# Patient Record
Sex: FEMALE | Race: WHITE | NOT HISPANIC OR LATINO | URBAN - METROPOLITAN AREA
[De-identification: names, ages, dates, MRNs, and addresses within clinical notes are randomized per-mention and may not be internally consistent; named-entity substitution may affect disease eponyms.]

---

## 2020-09-23 ENCOUNTER — EMERGENCY (EMERGENCY)
Facility: HOSPITAL | Age: 60
LOS: 0 days | Discharge: HOME | End: 2020-09-23
Attending: EMERGENCY MEDICINE | Admitting: EMERGENCY MEDICINE
Payer: COMMERCIAL

## 2020-09-23 VITALS
DIASTOLIC BLOOD PRESSURE: 71 MMHG | RESPIRATION RATE: 19 BRPM | TEMPERATURE: 99 F | WEIGHT: 179.9 LBS | HEART RATE: 94 BPM | SYSTOLIC BLOOD PRESSURE: 130 MMHG | OXYGEN SATURATION: 98 %

## 2020-09-23 DIAGNOSIS — M25.519 PAIN IN UNSPECIFIED SHOULDER: ICD-10-CM
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
## 2020-09-23 DIAGNOSIS — Z88.0 ALLERGY STATUS TO PENICILLIN: ICD-10-CM
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
## 2020-09-23 DIAGNOSIS — M25.511 PAIN IN RIGHT SHOULDER: ICD-10-CM

## 2020-09-23 PROCEDURE — 99283 EMERGENCY DEPT VISIT LOW MDM: CPT

## 2020-09-23 NOTE — ED ADULT NURSE NOTE - NSIMPLEMENTINTERV_GEN_ALL_ED
Implemented All Universal Safety Interventions:  [place] to call system. Call bell, personal items and telephone within reach. Instruct patient to call for assistance. Room bathroom lighting operational. Non-slip footwear when patient is off stretcher. Physically safe environment: no spills, clutter or unnecessary equipment. Stretcher in lowest position, wheels locked, appropriate side rails in place.
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
## 2020-09-23 NOTE — ED PROVIDER NOTE - PHYSICAL EXAMINATION
Gen: Alert, NAD, well appearing  Head: NC, AT, PERRL, EOMI, normal lids/conjunctiva  ENT: normal hearing  Neck: +supple, no tenderness/meningismus,  Pulm: Bilateral BS, normal resp effort, no wheeze/stridor/retractions  CV: RRR, no murmer  Abd: soft, NT/ND  Mskel: +tender to palpation right shoulder. No swelling or ecchymosis. FROM x 4. No edema/erythema/cyanosis. No back tenderness  Skin: no rash, warm/dry  Neuro: AAOx3, no sensory/motor deficits

## 2020-09-23 NOTE — ED PROVIDER NOTE - PATIENT PORTAL LINK FT
You can access the FollowMyHealth Patient Portal offered by [facility] by registering at the following website: [email or web address]. By joining [facility]’s FollowMyHealth portal, you will also be able to view your health information using other applications (apps) compatible with our system.

## 2020-09-23 NOTE — ED PROVIDER NOTE - NS ED ROS FT
Review of Systems    Constitutional: (-) fever, (-) chills  Eyes/ENT: (-) blurry vision, (-) epistaxis, (-) sore throat  Cardiovascular: (-) chest pain, (-) syncope  Respiratory: (-) cough, (-) shortness of breath  Gastrointestinal: (-) pain, (-) nausea, (-) vomiting, (-) diarrhea  Musculoskeletal: (-) neck pain, (-) back pain, (+) right shoulder pain  Integumentary: (-) rash, (-) edema  Neurological: (-) headache, (-) altered mental status  Psychiatric: (-) hallucinations  Allergic/Immunologic: (-) pruritus

## 2020-09-23 NOTE — ED ADULT NURSE NOTE - CAS DISCH TRANSFER METHOD
Date of Service: 07/23/2019    CHIEF COMPLAINT:  The patient is a 67-year-old.  She is seen today in allergy followup.    HISTORY OF PRESENT ILLNESS:  The patient has chronic allergic rhinitis and chronic allergic conjunctivitis.  Her skin tests have been positive for oak and birch tree pollens, as well as cat and dog.  She has 2 cats and 2 dogs in the house.  She does not feel that the Allegra is doing much.  She has previously taken Claritin and Zyrtec as well, and she feels that they did not help either.  She has taken Singulair as well and she did not feel this provided significant benefit.  She is having benefit with Flonase.  She is taking Flonase 2 sprays per nostril daily, but she is still symptomatic.  She complains of nasal congestion and clear rhinorrhea, sneezing, postnasal drip and nasal itching.  Because of her allergic rhinitis and because of her allergic conjunctivitis, she has intermittent ocular pruritus and watery eyes.  She is not having any facial pain, sinus headache, purulent rhinorrhea, or fevers or chills.  She is not having any urticaria, angioedema, or eczema.  She continues to have difficulties with hyposmia.  She has still not made an appointment to see ENT, and I again emphasized today that she sees ENT regarding the hyposmia.  She has chronic cough.  She has this in part from postnasal drainage and also she has reflux.  She has been seen by GI since she saw me.  She is taking Omeprazole and Zantac, she reports.  She is following up with GI.  Currently, she is not having any heartburn, she reports.  She also has shortness of breath with exertion and she has been seen by Dr. Wetzel, but she has not followed up and Dr. Wetzel did want to do cardiopulmonary exercise testing.  I emphasized that she follow up with Dr. Wetzel as well.  She has a contact dermatitis with latex and also with metals, and she has been doing a great job with avoidance regarding this and no eczema since we last  saw her.  No urticaria or angioedema either.    DRUG ALLERGIES:  Reviewed, recorded in Epic.    CURRENT MEDICATIONS:  Reviewed, recorded in [facility].    SOCIAL HISTORY:  Former smoker but currently does not smoke and has no passive smoke exposure.  Has 2 cats, 2 dogs, and 2 horses.    REVIEW OF SYSTEMS:  As stated in HPI.    PHYSICAL EXAMINATION:  GENERAL:  No acute distress, well developed and well nourished.  VITALS:  Stable, are reviewed, are recorded in Epic.  HEENT:  Conjunctivae are not injected.  No ocular discharge or swelling.  She has some nasal mucosal edema, some clear rhinorrhea but no sinus tenderness, no purulent secretions.  Oropharynx is moist and clear.  There is no thrush.  There is no angioedema including no lip, tongue, throat, face or eyelid swelling.  LUNGS:  Respiratory effort is normal.  Lungs are clear to auscultation.  There is no wheezing.  HEART:  Regular rate and rhythm.  SKIN:  No urticaria, angioedema, or eczema.    IMPRESSION AND RECOMMENDATIONS:  The patient has chronic allergic rhinitis and chronic allergic conjunctivitis.  Her skin tests have been positive for cat and dog as well as oak and birch tree pollens.  I emphasized diligent allergen avoidance and environmental control.  She is not going to be able to find new homes for her pets, but she will do her best in terms of allergen avoidance and environmental control.  We discussed allergic rhinitis, allergic conjunctivitis and we discussed treatment options.  She is having partial relief with Flonase and she will continue Flonase 2 sprays per nostril daily.  Antihistamines such as Allegra, Claritin and Zyrtec have not significantly helped her.  Also, Singulair has not significantly helped her.  We will do a trial of Astelin 2 sprays per nostril 2 times daily.  We will see how she does with a combination of Flonase 2 sprays per nostril daily and Astelin 2 sprays per nostril 2 times daily.  She can also do NasoGel or Ayr Gel for nasal  dryness.  For the allergic conjunctivitis, I prescribed Optivar eyedrops, place 1 drop into both eyes 2 times daily as needed for eye allergy symptoms.  I emphasized that she get regular comprehensive eye exams through the eye doctor as well.  I also discussed with her in detail, indications, objectives, risks, benefits, alternatives, schedule and duration of allergen specific immunotherapy injections.  She reports that she is going to think about this, but right now she does not want to start immunotherapy.  She has a chronic cough, and I believe this is multifactorial including from allergic rhinitis and postnasal drainage and also from her reflux.  Emphasized diligent reflux precautions and to take Omeprazole and Zantac as instructed by GI and follow up with GI regarding reflux as well.  Regarding her shortness of breath with exertion, I emphasized to follow up with Dr. Wetzel.  She was seen by Dr. Wetzel and he wanted to do cardiopulmonary exercise testing, but the patient has still not done this.  I emphasized that she follow up with Dr. Wetzel and pursue diagnostic testing as instructed by him.  She understands this.      Regarding her hyposmia, I have already referred her to our ENT Department here with Dr. Obrien at the medical office building.      Regarding the contact dermatitis with latex and also with metals, she will continue avoidance measures, and she will use products free of allergens and irritants such as Vanicream.  She does not need topical steroids at this time but more difficulties with eczema to let me know.      Any questions or concerns from the allergy standpoint, I can be notified.  Side effects of medications and proper way of using them emphasized.  Above was discussed in detail and all questions were answered.    Total time was 40 minutes, more than half of it was spent in discussion regarding the above-mentioned issues.      Dictated By: Wally Swift MD  Signing  Provider: MD CED Caraballo/arthur ([id number])  DD: 07/23/2019 12:43:15 TD: 07/24/2019 10:14:10    Copy Sent To:     cc: Fady Patino MD     Private car

## 2024-03-15 ENCOUNTER — OFFICE VISIT (OUTPATIENT)
Age: 64
End: 2024-03-15

## 2024-03-15 VITALS
BODY MASS INDEX: 27.38 KG/M2 | SYSTOLIC BLOOD PRESSURE: 110 MMHG | DIASTOLIC BLOOD PRESSURE: 78 MMHG | WEIGHT: 148.8 LBS | OXYGEN SATURATION: 95 % | HEIGHT: 62 IN | HEART RATE: 77 BPM

## 2024-03-15 DIAGNOSIS — Z98.84 HISTORY OF GASTRIC BYPASS: ICD-10-CM

## 2024-03-15 DIAGNOSIS — R10.32 LEFT LOWER QUADRANT PAIN: ICD-10-CM

## 2024-03-15 DIAGNOSIS — Z80.0 FAMILY HISTORY OF COLON CANCER: ICD-10-CM

## 2024-03-15 DIAGNOSIS — D50.8 OTHER IRON DEFICIENCY ANEMIA: ICD-10-CM

## 2024-03-15 DIAGNOSIS — R10.31 RIGHT LOWER QUADRANT PAIN: ICD-10-CM

## 2024-03-15 DIAGNOSIS — R63.0 DECREASED APPETITE: ICD-10-CM

## 2024-03-15 DIAGNOSIS — K21.9 GASTROESOPHAGEAL REFLUX DISEASE, UNSPECIFIED WHETHER ESOPHAGITIS PRESENT: Primary | ICD-10-CM

## 2024-03-15 PROCEDURE — 99204 OFFICE O/P NEW MOD 45 MIN: CPT | Performed by: INTERNAL MEDICINE

## 2024-03-15 RX ORDER — ELECTROLYTES/DEXTROSE
1 SOLUTION, ORAL ORAL DAILY
COMMUNITY

## 2024-03-15 RX ORDER — LAMOTRIGINE 200 MG/1
TABLET ORAL
COMMUNITY
Start: 2010-01-14

## 2024-03-15 RX ORDER — LAMOTRIGINE 100 MG/1
100 TABLET ORAL 2 TIMES DAILY
COMMUNITY
Start: 2024-02-07

## 2024-03-15 RX ORDER — LORAZEPAM 0.5 MG/1
0.5 TABLET ORAL 2 TIMES DAILY PRN
COMMUNITY
Start: 2024-01-24

## 2024-03-15 RX ORDER — CITALOPRAM 40 MG/1
40 TABLET ORAL DAILY
COMMUNITY

## 2024-03-15 RX ORDER — FERROUS SULFATE 15 MG/ML
DROPS ORAL
COMMUNITY
Start: 2023-11-01

## 2024-03-15 NOTE — PROGRESS NOTES
[facility]      Chief Complaint: Bile reflux history of gastric bypass 20 years ago.  Patient has been coughing bile    HPI:  Perri Cortes is a 64 y.o.  female who presents with in the evening.  She does not have actual heartburn but does occasionally get a sensation of fullness.  Her appetite is decreased.  She has not had any weight loss.  She had gastric bypass surgery done about 20 years ago.  Patient has postprandial right upper quadrant and right lower quadrant discomfort and recently has developed left lower quadrant discomfort as well.  These are intermittent and with no definitive exacerbating or remitting factor.  She has occasional rare dysphagia for solids.  She has no melena hematochezia or rectal bleeding.  She has not had an EGD since prior to her bypass surgery.  Last colonoscopy was 4 years ago for family history of colon cancer.  No change in bowel habits or stool caliber.  No other issues..      Review of Systems:   Constitutional: No fever or chills, feels well, no tiredness, no recent weight gain or weight loss.   HENT: No complaints of earache, no hearing loss, no nosebleeds, no nasal discharge, no sore throat, no hoarseness.    Eyes: No complaints of eye pain, no red eyes, no discharge from eyes, no itchy eyes.  Cardiovascular: No complaints of slow heart rate, no fast heart rate, no chest pain, no palpitations, no leg claudication, no lower extremity edema.   Respiratory: No complaints of shortness of breath, no wheezing, no cough, no SOB on exertion, no orthopnea.   Gastrointestinal: As noted in HPI  Genitourinary: No complaints of dysuria, no incontinence, no hesitancy, no nocturia.   Musculoskeletal: Positive arthralgia, no myalgias, no joint swelling or stiffness, no limb pain or swelling.   Neurological: No complaints of headache, no confusion, no convulsions, no numbness or tingling, no dizziness or fainting, no limb weakness, no difficulty walking.   "  Skin: No complaints of skin rash or skin lesions, no itching, no skin wound, no dry skin.    Hematological/Lymphatic: No complaints of swollen glands, does not bleed easy.   Allergic/Immunologic: No immunocompromised state.  Endocrine:  No complaints of polyuria, no polydipsia.   Psychiatric/Behavioral: Anxiety and depression, PTSD      Historical Information   Past Medical History:   Diagnosis Date    ADHD     Anemia     Anxiety and depression     Iron malabsorption      Past Surgical History:   Procedure Laterality Date    APPENDECTOMY      BARIATRIC SURGERY      ENDOMETRIAL ABLATION      HERNIA REPAIR       Social History   Social History     Substance and Sexual Activity   Alcohol Use Not Currently     Social History     Substance and Sexual Activity   Drug Use Not on file     Social History     Tobacco Use   Smoking Status Former    Types: Cigarettes   Smokeless Tobacco Never     Family History   Problem Relation Age of Onset    Diabetes Mother     Colon cancer Father     Colon cancer Maternal Grandmother          Current Medications: has a current medication list which includes the following prescription(s): citalopram, iron (ferrous sulfate), lamotrigine, lamotrigine, lorazepam, mefloquine hcl, and multivitamin adult.        Vital Signs: /78   Pulse 77   Ht 5' 2\" (1.575 m)   Wt 67.5 kg (148 lb 12.8 oz)   SpO2 95%   BMI 27.22 kg/m²       Physical Exam:   Constitutional  General Appearance: No acute distress, well appearing and well nourished  Head  Normocephalic  Eyes  Conjunctivae and lids: No swelling, erythema, or discharge.    Pupils and irises: Equal, round and reactive to light.   Ears, Nose, Mouth, and Throat  External inspection of ears and nose: Normal  Nasal mucosa, septum and turbinates: Normal without edema or erythema/   Oropharynx: Normal with no erythema, edema, exudate or lesions.   Neck  Normal range of motion. Neck supple.   Cardiovascular  Auscultation of the heart: Normal rate " and rhythm, normal S1 and S2 without murmurs.  Examination of the extremities for edema and/or varicosities: Normal  Pulmonary/Chest  Respiratory effort: No increased work of breathing or signs of respiratory distress.   Auscultation of lungs: Clear to auscultation, equal breath sounds bilaterally, no wheezes, rales, no rhonchi.   Abdomen  Abdomen: Non-tender, no masses.   Liver and spleen: No hepatomegaly or splenomegaly.   Musculoskeletal  Gait and station: normal.  Digits and Nails: normal without clubbing or cyanosis.  Inspection/palpation of joints, bones, and muscles: Normal  Neurological  No nystagmus or asterixis.   Skin  Skin and subcutaneous tissue: Normal without rashes or lesions.   Lymphatic  Palpation of the lymph nodes in neck: No lymphadenopathy.   Psychiatric  Orientation to person, place and time: Normal.  Mood and affect: Normal.         Labs:  Lab Results   Component Value Date    ALT 13 12/27/2023    AST 16 12/27/2023    BUN 14 12/27/2023    CALCIUM 9.2 12/27/2023     12/27/2023    CO2 29 12/27/2023    CREATININE 0.68 12/27/2023    K 4.6 12/27/2023         X-Rays & Procedures:   No orders to display           ______________________________________________________________________      Assessment & Plan:     Diagnoses and all orders for this visit:    Gastroesophageal reflux disease, unspecified whether esophagitis present  -     EGD; Future    Other iron deficiency anemia  -     Colonoscopy; Future  -     EGD; Future    Left lower quadrant pain  -     Colonoscopy; Future    Right lower quadrant pain  -     Colonoscopy; Future    Family history of colon cancer  -     Colonoscopy; Future    Decreased appetite  -     EGD; Future    History of gastric bypass  -     EGD; Future      Patient will undergo EGD and colonoscopy.  Further recommendations will depend on study results  Answers submitted by the patient for this visit:  Abdominal Pain Questionnaire (Submitted on 3/14/2024)  Chief  Complaint: Abdominal pain  Chronicity: chronic  Onset: more than 1 year ago  Onset quality: sudden  Frequency: every several days  Episode duration: 2 Hours  Progression since onset: unchanged  Pain location: RLQ  Pain - numeric: 4/10  Pain quality: cramping  Radiates to: does not radiate  anorexia: Yes  arthralgias: Yes  belching: No  constipation: No  diarrhea: Yes  dysuria: No  fever: No  flatus: Yes  frequency: No  headaches: Yes  hematochezia: No  hematuria: No  melena: No  myalgias: Yes  nausea: Yes  weight loss: Yes  vomiting: No  Aggravated by: certain positions, coughing, eating  Relieved by: being still, passing flatus, recumbency

## 2024-04-11 ENCOUNTER — PREP FOR PROCEDURE (OUTPATIENT)
Age: 64
End: 2024-04-11

## 2024-04-15 ENCOUNTER — ANESTHESIA (OUTPATIENT)
Dept: GASTROENTEROLOGY | Facility: HOSPITAL | Age: 64
End: 2024-04-15

## 2024-04-15 ENCOUNTER — HOSPITAL ENCOUNTER (OUTPATIENT)
Dept: GASTROENTEROLOGY | Facility: HOSPITAL | Age: 64
Setting detail: OUTPATIENT SURGERY
Discharge: HOME | End: 2024-04-15
Attending: INTERNAL MEDICINE | Admitting: INTERNAL MEDICINE
Payer: COMMERCIAL

## 2024-04-15 ENCOUNTER — ANESTHESIA EVENT (OUTPATIENT)
Dept: GASTROENTEROLOGY | Facility: HOSPITAL | Age: 64
End: 2024-04-15

## 2024-04-15 VITALS
WEIGHT: 143.52 LBS | RESPIRATION RATE: 16 BRPM | HEART RATE: 65 BPM | SYSTOLIC BLOOD PRESSURE: 109 MMHG | OXYGEN SATURATION: 97 % | DIASTOLIC BLOOD PRESSURE: 59 MMHG | TEMPERATURE: 97.5 F | HEIGHT: 62 IN | BODY MASS INDEX: 26.41 KG/M2

## 2024-04-15 DIAGNOSIS — R10.32 LEFT LOWER QUADRANT PAIN: ICD-10-CM

## 2024-04-15 DIAGNOSIS — Z98.84 HISTORY OF GASTRIC BYPASS: ICD-10-CM

## 2024-04-15 DIAGNOSIS — D50.8 OTHER IRON DEFICIENCY ANEMIA: ICD-10-CM

## 2024-04-15 DIAGNOSIS — R63.0 DECREASED APPETITE: ICD-10-CM

## 2024-04-15 DIAGNOSIS — K22.10 EROSIVE ESOPHAGITIS: Primary | ICD-10-CM

## 2024-04-15 DIAGNOSIS — K21.9 GASTROESOPHAGEAL REFLUX DISEASE, UNSPECIFIED WHETHER ESOPHAGITIS PRESENT: ICD-10-CM

## 2024-04-15 DIAGNOSIS — R10.31 RIGHT LOWER QUADRANT PAIN: ICD-10-CM

## 2024-04-15 DIAGNOSIS — Z80.0 FAMILY HISTORY OF COLON CANCER: ICD-10-CM

## 2024-04-15 PROBLEM — F31.9 BIPOLAR DISORDER (HCC): Status: ACTIVE | Noted: 2021-04-12

## 2024-04-15 PROBLEM — F90.9 ADHD (ATTENTION DEFICIT HYPERACTIVITY DISORDER): Status: ACTIVE | Noted: 2020-01-01

## 2024-04-15 PROBLEM — F41.9 ANXIETY: Status: ACTIVE | Noted: 2022-03-07

## 2024-04-15 PROBLEM — D50.9 IRON DEFICIENCY ANEMIA: Status: ACTIVE | Noted: 2021-04-12

## 2024-04-15 PROCEDURE — 88305 TISSUE EXAM BY PATHOLOGIST: CPT | Performed by: PATHOLOGY

## 2024-04-15 RX ORDER — PANTOPRAZOLE SODIUM 40 MG/1
40 TABLET, DELAYED RELEASE ORAL
Qty: 60 TABLET | Refills: 2 | Status: SHIPPED | OUTPATIENT
Start: 2024-04-15

## 2024-04-15 RX ORDER — LIDOCAINE HYDROCHLORIDE 20 MG/ML
INJECTION, SOLUTION EPIDURAL; INFILTRATION; INTRACAUDAL; PERINEURAL AS NEEDED
Status: DISCONTINUED | OUTPATIENT
Start: 2024-04-15 | End: 2024-04-15

## 2024-04-15 RX ORDER — SODIUM CHLORIDE, SODIUM LACTATE, POTASSIUM CHLORIDE, CALCIUM CHLORIDE 600; 310; 30; 20 MG/100ML; MG/100ML; MG/100ML; MG/100ML
INJECTION, SOLUTION INTRAVENOUS CONTINUOUS PRN
Status: DISCONTINUED | OUTPATIENT
Start: 2024-04-15 | End: 2024-04-15

## 2024-04-15 RX ORDER — PROPOFOL 10 MG/ML
INJECTION, EMULSION INTRAVENOUS AS NEEDED
Status: DISCONTINUED | OUTPATIENT
Start: 2024-04-15 | End: 2024-04-15

## 2024-04-15 RX ADMIN — PROPOFOL 50 MG: 10 INJECTION, EMULSION INTRAVENOUS at 10:44

## 2024-04-15 RX ADMIN — LIDOCAINE HYDROCHLORIDE 80 MG: 20 INJECTION, SOLUTION EPIDURAL; INFILTRATION; INTRACAUDAL at 10:41

## 2024-04-15 RX ADMIN — PROPOFOL 50 MG: 10 INJECTION, EMULSION INTRAVENOUS at 10:53

## 2024-04-15 RX ADMIN — PROPOFOL 50 MG: 10 INJECTION, EMULSION INTRAVENOUS at 10:47

## 2024-04-15 RX ADMIN — PROPOFOL 150 MG: 10 INJECTION, EMULSION INTRAVENOUS at 10:41

## 2024-04-15 RX ADMIN — PROPOFOL 50 MG: 10 INJECTION, EMULSION INTRAVENOUS at 10:50

## 2024-04-15 RX ADMIN — SODIUM CHLORIDE, SODIUM LACTATE, POTASSIUM CHLORIDE, AND CALCIUM CHLORIDE: .6; .31; .03; .02 INJECTION, SOLUTION INTRAVENOUS at 10:09

## 2024-04-15 NOTE — ANESTHESIA POSTPROCEDURE EVALUATION
Post-Op Assessment Note    CV Status:  Stable    Pain management: adequate       Mental Status:  Arousable and sleepy   Hydration Status:  Euvolemic   PONV Controlled:  Controlled   Airway Patency:  Patent     Post Op Vitals Reviewed: Yes    No anethesia notable event occurred.    Staff: CRNA               /56 (04/15/24 1105)    Temp 97.5 °F (36.4 °C) (04/15/24 1105)    Pulse 66 (04/15/24 1105)   Resp 16 (04/15/24 1105)    SpO2 97 % (04/15/24 1105)

## 2024-04-15 NOTE — H&P
"History and Physical -  [facility]  Perri Cortes 64 y.o. female MRN: [id number]                  HPI: Perri Cortes is a 64 y.o. year old female who presents for EGD and colonoscopy for GERD, iron deficiency anemia, decreased appetite, history of gastric bypass, left lower quadrant pain, family history of colon cancer.  Last colonoscopy 4 years ago      REVIEW OF SYSTEMS: Per the HPI, and otherwise unremarkable.    Historical Information   Past Medical History:   Diagnosis Date    ADHD     Anemia     Anxiety and depression     Iron malabsorption      Past Surgical History:   Procedure Laterality Date    APPENDECTOMY      BARIATRIC SURGERY      COLONOSCOPY      ENDOMETRIAL ABLATION      HERNIA REPAIR       Social History   Social History     Substance and Sexual Activity   Alcohol Use Not Currently     Social History     Substance and Sexual Activity   Drug Use Not Currently     Social History     Tobacco Use   Smoking Status Former    Types: Cigarettes   Smokeless Tobacco Never     Family History   Problem Relation Age of Onset    Diabetes Mother     Colon cancer Father     Colon cancer Maternal Grandmother        Meds/Allergies     (Not in a hospital admission)      Allergies   Allergen Reactions    Penicillins Abdominal Pain, Anxiety, Dizziness, Hallucinations, Headache, Hives, Itching, Rash and Swelling       Objective     Blood pressure 123/69, pulse 70, temperature 97.6 °F (36.4 °C), temperature source Temporal, resp. rate 16, height 5' 2\" (1.575 m), weight 65.1 kg (143 lb 8.3 oz), SpO2 97%.      PHYSICAL EXAM    Gen: NAD  CV: RRR  CHEST: Clear  ABD: soft, NT/ND  EXT: no edema  Neuro: AAO      ASSESSMENT/PLAN:  This is a 64 y.o. year old female here for GERD, iron deficiency anemia, decreased appetite, left lower quadrant pain, family history of colon cancer    PLAN:   Procedure: EGD and colonoscopy          "

## 2024-04-15 NOTE — ANESTHESIA PREPROCEDURE EVALUATION
"Procedure:  COLONOSCOPY  EGD    Relevant Problems   HEMATOLOGY   (+) Iron deficiency anemia      NEURO/PSYCH   (+) Anxiety   (+) Depression      Behavioral Health   (+) ADHD (attention deficit hyperactivity disorder)   (+) Bipolar disorder (HCC)      Ear/Nose/Throat   (+) Allergic rhinitis due to pollen      Surgery/Wound/Pain   (+) Bariatric surgery status        Physical Exam    Airway    Mallampati score: II  TM Distance: >3 FB  Neck ROM: full     Dental       Cardiovascular      Pulmonary      Other Findings  post-pubertal.      Anesthesia Plan  ASA Score- 1     Anesthesia Type- IV sedation with anesthesia with ASA Monitors.         Additional Monitors:     Airway Plan:     Comment: Recent labs personally reviewed:  No results found for: \"WBC\", \"HGB\", \"PLT\"  Lab Results       Component                Value               Date                       K                        4.2                 03/26/2024                 BUN                      14                  03/26/2024                 CREATININE               0.66                03/26/2024            No results found for: \"PTT\"   No results found for: \"INR\"    Blood type     I, Jody Candelaria MD, have personally seen and evaluated the patient prior to anesthetic care.  I have reviewed the pre-anesthetic record, medical history, allergies, medications and any other medical records if appropriate to the anesthetic care.  If a CRNA is involved in the case, I have reviewed the CRNA assessment, if present, and agree. Patient consented for IV Sedation, general anesthesia as back up. Discussed risks of aspiration, IV infiltration, indications for conversion to general anesthesia. All questions and concerns addressed.     .       Plan Factors-Exercise tolerance (METS): >4 METS.    Chart reviewed.   Existing labs reviewed. Patient summary reviewed.    Patient is not a current smoker.  Patient did not smoke on day of surgery.    Obstructive sleep apnea risk education " given perioperatively.        Induction- intravenous.    Postoperative Plan-     Informed Consent- Anesthetic plan and risks discussed with patient.  I personally reviewed this patient with the CRNA. Discussed and agreed on the Anesthesia Plan with the CRNA..

## 2024-04-15 NOTE — DISCHARGE INSTRUCTIONS
Colonoscopy   WHAT YOU NEED TO KNOW:   A colonoscopy is a procedure to examine the inside of your colon (intestine) with a scope. Polyps or tissue growths may have been removed during your colonoscopy. It is normal to feel bloated and to have some abdominal discomfort. You should be passing gas. If you have hemorrhoids or you had polyps removed, you may have a small amount of bleeding.        DISCHARGE INSTRUCTIONS:   Seek care immediately if:   You have sudden, severe abdominal pain.     You have problems swallowing.     You have a large amount of black, sticky bowel movements or blood in your bowel movements.     You have sudden trouble breathing.     You feel weak, lightheaded, or faint or your heart beats faster than normal for you.     Contact your healthcare provider if:   You have a fever and chills.      You have nausea or are vomiting.      Your abdomen is bloated or feels full and hard.     You have abdominal pain.   You have black, sticky bowel movements or blood in your bowel movements.  You have not had a bowel movement for 3 days after your procedure.  You have rash or hives.  You have questions or concerns about your procedure.    Activity:   Do not lift, strain, or run for 24 hours after your procedure.     Rest after your procedure. You have been given medicine to relax you. Do not drive or make important decisions until the day after your procedure. Return to your normal activity as directed.     Relieve gas and discomfort from bloating by lying on your right side with a heating pad on your abdomen. You may need to take short walks to help the gas move out. Eat small meals until bloating is relieved.  Follow up with your healthcare provider as directed: Write down your questions so you remember to ask them during your visits.     If you take a “blood thinner”, please review the specific instructions from your endoscopist about when you should resume it. These can be found in the “Recommendation”  and “Your Medication list” sections of this After Visit Summary.    Upper Endoscopy   WHAT YOU NEED TO KNOW:   An upper endoscopy is also called an upper gastrointestinal (GI) endoscopy, or an esophagogastroduodenoscopy (EGD). It is a procedure to examine the inside of your esophagus, stomach, and duodenum (first part of the small intestine) with a scope. You may feel bloated, gassy, or have some abdominal discomfort after your procedure. Your throat may be sore for 24 to 36 hours. You may burp or pass gas from air that is still inside your body.         DISCHARGE INSTRUCTIONS:   Seek care immediately if:   You have sudden, severe abdominal pain.     You have problems swallowing.     You have a large amount of black, sticky bowel movements or blood in your bowel movements.     You have sudden trouble breathing.     You feel weak, lightheaded, or faint or your heart beats faster than normal for you.     Contact your healthcare provider if:   You have a fever and chills.      You have nausea or are vomiting.      Your abdomen is bloated or feels full and hard.     You have abdominal pain.   You have black, sticky bowel movements or blood in your bowel movements.  You have not had a bowel movement for 3 days after your procedure.  You have rash or hives.  You have questions or concerns about your procedure.    Activity:   Do not lift, strain, or run for 24 hours after your procedure.     Rest after your procedure. You have been given medicine to relax you. Do not drive or make important decisions until the day after your procedure. Return to your normal activity as directed.     Relieve gas and discomfort from bloating by lying on your right side with a heating pad on your abdomen. You may need to take short walks to help the gas move out. Eat small meals until bloating is relieved.  Follow up with your healthcare provider as directed: Write down your questions so you remember to ask them during your visits.     If you  take a “blood thinner”, please review the specific instructions from your endoscopist about when you should resume it. These can be found in the “Recommendation” and “Your Medication list” sections of this After Visit Summary.

## 2024-04-16 PROCEDURE — 88305 TISSUE EXAM BY PATHOLOGIST: CPT | Performed by: PATHOLOGY

## 2024-04-19 ENCOUNTER — TELEPHONE (OUTPATIENT)
Age: 64
End: 2024-04-19

## 2024-04-19 ENCOUNTER — TRANSCRIBE ORDERS (OUTPATIENT)
Age: 64
End: 2024-04-19

## 2024-04-19 DIAGNOSIS — D50.9 IRON DEFICIENCY ANEMIA, UNSPECIFIED IRON DEFICIENCY ANEMIA TYPE: Primary | ICD-10-CM

## 2024-04-19 NOTE — TELEPHONE ENCOUNTER
----- Message from Tonie Parekh MA sent at 4/15/2024 12:47 PM EDT -----    ----- Message -----  From: Jonny Thakkar MD  Sent: 4/15/2024  11:02 AM EDT  To: [facility]    Please set up for capsule and deficiency anemia

## 2024-04-19 NOTE — TELEPHONE ENCOUNTER
Scheduled date of Capsule Endoscopy (as of today): 4/25/24  Physician performing Capsule Endoscopy: Ariane  Location of Capsule Endoscopy: [facility]  Capsule prep sent by:  Loterityradames  Instructions and procedure reviewed with patient by: Lyric FULLER

## 2024-04-25 ENCOUNTER — PROCEDURE VISIT (OUTPATIENT)
Age: 64
End: 2024-04-25
Payer: COMMERCIAL

## 2024-04-25 DIAGNOSIS — D50.9 IRON DEFICIENCY ANEMIA, UNSPECIFIED IRON DEFICIENCY ANEMIA TYPE: ICD-10-CM

## 2024-04-26 PROCEDURE — 91110 GI TRC IMG INTRAL ESOPH-ILE: CPT | Performed by: INTERNAL MEDICINE

## 2024-04-27 NOTE — PROGRESS NOTES
Capsule read and results reported to patient  Told it was suboptimal, did not pass into colon during time allotted  She will keep an eye and call back monday

## 2024-06-06 DIAGNOSIS — K22.10 EROSIVE ESOPHAGITIS: ICD-10-CM

## 2024-06-06 RX ORDER — PANTOPRAZOLE SODIUM 40 MG/1
40 TABLET, DELAYED RELEASE ORAL
Qty: 90 TABLET | Refills: 1 | Status: SHIPPED | OUTPATIENT
Start: 2024-06-06

## 2024-10-11 ENCOUNTER — OFFICE VISIT (OUTPATIENT)
Dept: GASTROENTEROLOGY | Facility: CLINIC | Age: 64
End: 2024-10-11
Payer: COMMERCIAL

## 2024-10-11 VITALS
TEMPERATURE: 98 F | HEIGHT: 62 IN | RESPIRATION RATE: 18 BRPM | SYSTOLIC BLOOD PRESSURE: 122 MMHG | DIASTOLIC BLOOD PRESSURE: 82 MMHG | WEIGHT: 135 LBS | OXYGEN SATURATION: 97 % | HEART RATE: 87 BPM | BODY MASS INDEX: 24.84 KG/M2

## 2024-10-11 DIAGNOSIS — K21.9 LARYNGOPHARYNGEAL REFLUX (LPR): ICD-10-CM

## 2024-10-11 DIAGNOSIS — K22.10 EROSIVE ESOPHAGITIS: Primary | ICD-10-CM

## 2024-10-11 DIAGNOSIS — K55.20 AVM (ARTERIOVENOUS MALFORMATION) OF SMALL BOWEL, ACQUIRED: ICD-10-CM

## 2024-10-11 DIAGNOSIS — Z98.84 HISTORY OF GASTRIC BYPASS: ICD-10-CM

## 2024-10-11 DIAGNOSIS — D50.8 OTHER IRON DEFICIENCY ANEMIA: ICD-10-CM

## 2024-10-11 PROCEDURE — 99214 OFFICE O/P EST MOD 30 MIN: CPT | Performed by: PHYSICIAN ASSISTANT

## 2024-10-11 RX ORDER — AZITHROMYCIN 250 MG/1
TABLET, FILM COATED ORAL
COMMUNITY
Start: 2024-08-25

## 2024-10-11 RX ORDER — TRAZODONE HYDROCHLORIDE 50 MG/1
1-2 TABLET, FILM COATED ORAL
COMMUNITY
Start: 2024-05-24

## 2024-10-11 RX ORDER — CITALOPRAM HYDROBROMIDE 10 MG/1
10 TABLET ORAL DAILY
COMMUNITY
Start: 2024-09-04

## 2024-10-11 RX ORDER — PREDNISONE 20 MG/1
TABLET ORAL
COMMUNITY
Start: 2024-08-25

## 2024-10-11 RX ORDER — OMEPRAZOLE 40 MG/1
40 CAPSULE, DELAYED RELEASE ORAL 2 TIMES DAILY
Qty: 60 CAPSULE | Refills: 3 | Status: SHIPPED | OUTPATIENT
Start: 2024-10-11

## 2024-10-11 NOTE — PROGRESS NOTES
[facility]  Perri Cortes 64 y.o. female MRN: [id number]       CC: Follow-up, iron deficiency and LPR    HPI: Perri is a 64-year-old female with history of Flavio-en-Y bypass, anxiety, GERD, and iron deficiency on iron infusions at [facility] hematology/oncology.  Patient presents the office today for follow-up.  She had recent endoscopic evaluation with Dr. Thakkar as outlined below.  Patient wanted to follow-up in the office as she has been experiencing a chronic cough and globus sensation.  She is on pantoprazole, but does not feel that this has helped her symptoms.  She states that she previously had a pulmonary workup that was negative.  Her Falvio-en-Y bypass was performed in 2001 in [facility].    Last EGD and colonoscopy were performed by Dr. Thakkar.  EGD with mild erosion in the GE junction otherwise the remainder exam appears to have been normal.  Colonoscopy was normal up to the terminal ileum.  Was recommended a 5-year recall.  She then had a capsule endoscopy, but the prep was suboptimal.    Review of Systems:    CONSTITUTIONAL: Denies any fever, chills, or rigors. Good appetite, and no recent weight loss.  HEENT: No earache or tinnitus. Denies hearing loss or visual disturbances.  CARDIOVASCULAR: No chest pain or palpitations.   RESPIRATORY: Denies any cough, hemoptysis, shortness of breath or dyspnea on exertion.  GASTROINTESTINAL: As noted in the History of Present Illness.   GENITOURINARY: No problems with urination. Denies any hematuria or dysuria.  NEUROLOGIC: No dizziness or vertigo, denies headaches.   MUSCULOSKELETAL: Denies any muscle or joint pain.   SKIN: Denies skin rashes or itching.   ENDOCRINE: Denies excessive thirst. Denies intolerance to heat or cold.  PSYCHOSOCIAL: Denies depression or anxiety. Denies any recent memory loss.       Current Outpatient Medications   Medication Sig Dispense Refill    azithromycin (ZITHROMAX) 250 mg tablet Take 2  tablets by mouth on day one; then one tablet daily for 4 days.      Citalopram Hydrobromide (CELEXA PO) Take 30 mg by mouth daily      [employer] Vitamin B-12 1000 MCG tablet Take 1,000 mcg by mouth daily      Iron, Ferrous Sulfate, 75 (15 Fe) MG/ML SOLN       lamoTRIgine (LaMICtal) 100 mg tablet Take 100 mg by mouth 2 (two) times a day      LORazepam (ATIVAN) 0.5 mg tablet Take 0.5 mg by mouth 2 (two) times a day as needed      Multiple Vitamin (Multivitamin Adult) TABS Take 1 tablet by mouth daily      pantoprazole (PROTONIX) 40 mg tablet TAKE 1 TABLET BY MOUTH DAILY BEFORE BREAKFAST 90 tablet 1    predniSONE 20 mg tablet take 1 tablet by mouth twice a day for 5 days      traZODone (DESYREL) 50 mg tablet Take 1-2 tablets by mouth daily at bedtime as needed      citalopram (CeleXA) 10 mg tablet Take 10 mg by mouth daily (Patient not taking: Reported on 10/11/2024)      citalopram (CeleXA) 40 mg tablet Take 40 mg by mouth daily (Patient not taking: Reported on 10/11/2024)      Mefloquine HCl (LARIAM PO)  (Patient not taking: Reported on 2024)       No current facility-administered medications for this visit.     Past Medical History:   Diagnosis Date    ADHD     Anemia     Anxiety and depression     Hernia 2012    Surgery    Iron malabsorption      Past Surgical History:   Procedure Laterality Date    APPENDECTOMY      BARIATRIC SURGERY      COLONOSCOPY      ENDOMETRIAL ABLATION      HERNIA REPAIR       Social History     Socioeconomic History    Marital status: /Civil Union     Spouse name: None    Number of children: None    Years of education: None    Highest education level: None   Occupational History    None   Tobacco Use    Smoking status: Former     Current packs/day: 0.00     Average packs/day: 0.5 packs/day for 5.0 years (2.5 ttl pk-yrs)     Types: Cigarettes     Quit date: 1985     Years since quittin.8    Smokeless tobacco: Never   Vaping Use    Vaping status: Never Used   Substance and  "Sexual Activity    Alcohol use: Not Currently     Comment: Less than 12 a year    Drug use: Not Currently    Sexual activity: Not Currently     Birth control/protection: None   Other Topics Concern    None   Social History Narrative    None     Social Determinants of Health     Financial Resource Strain: Not on file   Food Insecurity: Not on file   Transportation Needs: Not on file   Physical Activity: Not on file   Stress: Not on file   Social Connections: Not on file   Intimate Partner Violence: Not on file   Housing Stability: Not on file     Family History   Problem Relation Age of Onset    Diabetes Mother     Arthritis Mother     Asthma Mother     Hearing loss Mother     Hypertension Mother     Colon cancer Father     Addiction problem Father     Cancer Father     Hypertension Father     Colon cancer Maternal Grandmother     Depression Sister     Hearing loss Sister     Depression Sister             PHYSICAL EXAM:    Vitals:    10/11/24 1435   BP: 122/82   BP Location: Left arm   Patient Position: Sitting   Cuff Size: Standard   Pulse: 87   Resp: 18   Temp: 98 °F (36.7 °C)   TempSrc: Tympanic   SpO2: 97%   Weight: 61.2 kg (135 lb)   Height: 5' 2\" (1.575 m)     General Appearance:   Alert and oriented x 3. Cooperative, and in no respiratory distress   HEENT:   Normocephalic, atraumatic, anicteric.     Neck:  Supple, symmetrical, trachea midline   Lungs:   Clear to auscultation bilaterally    Heart::   Regular rate and rhythm   Abdomen:   Soft, non-tender, non-distended; normal bowel sounds; no masses, no organomegaly    Genitalia:   Deferred    Rectal:   Deferred    Extremities:  No cyanosis, clubbing or edema    Pulses:  2+ and symmetric all extremities    Skin:  Skin color, texture, turgor normal, no rashes or lesions    Lymph nodes:  No palpable cervical or supraclavicular lymphadenopathy        Lab Results:       Results from last 6 Months   Lab Units 08/09/24  1024   POTASSIUM mmol/L 4.0   CHLORIDE mmol/L " 104   CO2 mmol/L 24   BUN mg/dL 13   CREATININE mg/dL 0.74   CALCIUM mg/dL 9.1   ALK PHOS U/L 60   ALT U/L 12   AST U/L 16               Imaging Studies:   Colonoscopy    Result Date: 4/15/2024  Impression: The terminal ileum, ileocecal valve, cecum, ascending colon, hepatic flexure, transverse colon, splenic flexure, descending colon, sigmoid colon, rectosigmoid and rectum appeared normal. Performed forceps biopsies in the terminal ileum, ascending colon and descending colon RECOMMENDATION:  Repeat colonoscopy in 5 years  Family history of colon cancer  Follow-up biopsy results in 3 weeks  Jonny Thakkar MD     EGD    Result Date: 4/15/2024  Impression: The upper third of the esophagus, middle third of the esophagus and lower third of the esophagus appeared normal. Mild abnormal mucosa with erosion in the GE junction; performed cold forceps biopsy The body of the stomach appeared normal. Performed forceps biopsies in the body of the stomach The jejunum appeared normal. RECOMMENDATION:  Await pathology results Follow-up biopsy results in 3 weeks Begin Protonix 40 mg p.o. every morning half hour AC breakfast x 8 weeks   Jonny Thakkar MD       ASSESSMENT and PLAN:      1) Flavio-en-Y bypass, LPR and GE junction erosion - Switch from pantoprazole to omeprazole.  Open omeprazole capsules and take 30 to 60 minutes before breakfast and 30 to 60 minutes before dinner for 2 months, then decrease to once daily dosing.  Patient already sleeps elevated in the bed.  Patient agrees with plan    2) Iron deficiency, low ferritin status post iron infusions; AVMs - Ferritin is now 100.  Follows with Mariann Herrera PA-C at [facility] hematology oncology.  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  We appreciate her recommendations.  - In the future, could repeat a video capsule endoscopy if patient's ferritin is downtrending again as it would be more accurate  - Follow-up with hematology  -  "Patient has not seen a bariatric surgery provider, referral placed      Follow up in 8-12 weeks.      Portions of the record may have been created with voice recognition software.  Occasional wrong word or \"sound a like\" substitutions may have occurred due to the inherent limitations of voice recognition software.  Read the chart carefully and recognize, using context, where substitutions have occurred.  "

## 2024-11-03 DIAGNOSIS — K21.9 LARYNGOPHARYNGEAL REFLUX (LPR): ICD-10-CM

## 2024-11-03 DIAGNOSIS — K22.10 EROSIVE ESOPHAGITIS: ICD-10-CM

## 2024-11-05 RX ORDER — OMEPRAZOLE 40 MG/1
40 CAPSULE, DELAYED RELEASE ORAL 2 TIMES DAILY
Qty: 180 CAPSULE | Refills: 1 | Status: SHIPPED | OUTPATIENT
Start: 2024-11-05

## 2024-11-22 ENCOUNTER — OFFICE VISIT (OUTPATIENT)
Age: 64
End: 2024-11-22
Payer: COMMERCIAL

## 2024-11-22 ENCOUNTER — PATIENT MESSAGE (OUTPATIENT)
Age: 64
End: 2024-11-22

## 2024-11-22 VITALS
SYSTOLIC BLOOD PRESSURE: 120 MMHG | RESPIRATION RATE: 18 BRPM | OXYGEN SATURATION: 98 % | BODY MASS INDEX: 25.8 KG/M2 | TEMPERATURE: 96.6 F | WEIGHT: 140.2 LBS | DIASTOLIC BLOOD PRESSURE: 76 MMHG | HEART RATE: 75 BPM | HEIGHT: 62 IN

## 2024-11-22 DIAGNOSIS — R05.3 PERSISTENT COUGH: ICD-10-CM

## 2024-11-22 DIAGNOSIS — Z78.0 ASYMPTOMATIC UNCOMPLICATED MENOPAUSE: ICD-10-CM

## 2024-11-22 DIAGNOSIS — D50.9 IRON DEFICIENCY ANEMIA, UNSPECIFIED IRON DEFICIENCY ANEMIA TYPE: ICD-10-CM

## 2024-11-22 DIAGNOSIS — Z12.31 ENCOUNTER FOR SCREENING MAMMOGRAM FOR BREAST CANCER: ICD-10-CM

## 2024-11-22 DIAGNOSIS — Z11.59 NEED FOR HEPATITIS C SCREENING TEST: ICD-10-CM

## 2024-11-22 DIAGNOSIS — Z12.4 SCREENING FOR CERVICAL CANCER: ICD-10-CM

## 2024-11-22 DIAGNOSIS — Z98.84 BARIATRIC SURGERY STATUS: ICD-10-CM

## 2024-11-22 DIAGNOSIS — Z11.4 SCREENING FOR HIV (HUMAN IMMUNODEFICIENCY VIRUS): ICD-10-CM

## 2024-11-22 DIAGNOSIS — Z13.220 ENCOUNTER FOR LIPID SCREENING FOR CARDIOVASCULAR DISEASE: ICD-10-CM

## 2024-11-22 DIAGNOSIS — E55.9 VITAMIN D INSUFFICIENCY: ICD-10-CM

## 2024-11-22 DIAGNOSIS — Z13.6 ENCOUNTER FOR LIPID SCREENING FOR CARDIOVASCULAR DISEASE: ICD-10-CM

## 2024-11-22 DIAGNOSIS — R05.3 PERSISTENT COUGH: Primary | ICD-10-CM

## 2024-11-22 DIAGNOSIS — F33.41 RECURRENT MAJOR DEPRESSIVE DISORDER, IN PARTIAL REMISSION (HCC): ICD-10-CM

## 2024-11-22 DIAGNOSIS — Z00.00 HEALTHCARE MAINTENANCE: Primary | ICD-10-CM

## 2024-11-22 DIAGNOSIS — F41.9 ANXIETY: ICD-10-CM

## 2024-11-22 DIAGNOSIS — E78.5 DYSLIPIDEMIA: ICD-10-CM

## 2024-11-22 PROBLEM — F31.9 BIPOLAR DISORDER (HCC): Status: RESOLVED | Noted: 2021-04-12 | Resolved: 2024-11-22

## 2024-11-22 PROBLEM — F90.9 ADHD (ATTENTION DEFICIT HYPERACTIVITY DISORDER): Status: RESOLVED | Noted: 2020-01-01 | Resolved: 2024-11-22

## 2024-11-22 PROCEDURE — 99214 OFFICE O/P EST MOD 30 MIN: CPT | Performed by: FAMILY MEDICINE

## 2024-11-22 RX ORDER — BUDESONIDE AND FORMOTEROL FUMARATE DIHYDRATE 80; 4.5 UG/1; UG/1
2 AEROSOL RESPIRATORY (INHALATION) 2 TIMES DAILY
Qty: 30.6 G | Refills: 3 | Status: SHIPPED | OUTPATIENT
Start: 2024-11-22

## 2024-11-22 NOTE — ASSESSMENT & PLAN NOTE
Following psych team for med management and talk therapy.  Currently prescribed: Celexa 30 mg daily, Lamictal 100 mg twice daily, Ativan 0.5 mg at night  Reports: Taking as prescribed.  Med Adjusted: None.  Continue med management by specialist  Advised on the importance of building and maintaining good coping mechanisms with recommendations as discussed including  Yoga, recommend 3-4x/week  Breath-work/Meditation  Journalling, consistently.   Exercising/walking consistently.     Orders:    TSH, 3rd generation    T4, free

## 2024-11-22 NOTE — ASSESSMENT & PLAN NOTE
Depression Screening Follow-up Plan: Patient's depression screening was positive with a PHQ-9 score of 5. Patient assessed for underlying major depression. They have no active suicidal ideations. Brief counseling provided and recommend additional follow-up/re-evaluation next office visit.

## 2024-11-22 NOTE — PROGRESS NOTES
[facility]  Annual Physical & Office Visit     PATIENT INFORMATION   Name: Perri Cortes   YOB: 1960   MRN: [id number]  Encounter Provider: Babak Eduardo MD    Encounter Date: 11/22/2024    ASSESSMENT & PLAN     Assessment & Plan  Healthcare maintenance  Healthcare Maintenance  Health maintenance completed today  - Medical history reviewed, including existing medical conditions, medications, and surgeries.   - Labs discussed to evaluate cholesterol, blood sugar, kidney function, liver function, and other important markers of health.  - BMI evaluated and discussed.  - Lifestyle and health counseling completed including diet, exercise habits, smoking status, alcohol consumption.   - Bone & Heart health reviewed  - Cancer screenings discussed: Mammogram/Pap smear/CT lung/colonoscopy.   - Vaccination status reviewed and pertinent immunizations and booster shots discussed.  - Skin examination: Discussed importance of sunscreen and other preventative measures for skin cancer.  - Mental health and wellbeing evaluated and discussed.  - Family history obtained to identify any of hereditary health risks.  Lab orders in place as discussed  Start/continue preventative measures as discussed/advised  Complete preventative orders in place as recommended.   Refer to screenings problem list        Encounter for screening mammogram for breast cancer         Persistent cough  Following GI, currently on omeprazole 40 mg twice daily.  Previously tried inhaler during initial journey of persistent cough and nighttime.  Recommended trying an inhaler again.  Sent in prescription for Symbicort.  Continue care with specialist, return in 3 months  Recommended magnesium oxide, probiotics  Orders:    budesonide-formoterol (SYMBICORT) 80-4.5 MCG/ACT inhaler; Inhale 2 puffs 2 (two) times a day Rinse mouth after use.    Bariatric surgery status         Anxiety  Following psych team for med management and talk  therapy.  Currently prescribed: Celexa 30 mg daily, Lamictal 100 mg twice daily, Ativan 0.5 mg at night  Reports: Taking as prescribed.  Med Adjusted: None.  Continue med management by specialist  Advised on the importance of building and maintaining good coping mechanisms with recommendations as discussed including  Yoga, recommend 3-4x/week  Breath-work/Meditation  Journalling, consistently.   Exercising/walking consistently.     Orders:    TSH, 3rd generation    T4, free    Recurrent major depressive disorder, in partial remission (HCC)  Depression Screening Follow-up Plan: Patient's depression screening was positive with a PHQ-9 score of 5. Patient assessed for underlying major depression. They have no active suicidal ideations. Brief counseling provided and recommend additional follow-up/re-evaluation next office visit.         Iron deficiency anemia, unspecified iron deficiency anemia type         Vitamin D insufficiency    Orders:    Vitamin D 25 hydroxy    Encounter for lipid screening for cardiovascular disease    Orders:    Lipid Panel with Direct LDL reflex    Apolipoprotein B    Lipoprotein A (LPA)    Dyslipidemia    Orders:    Lipid Panel with Direct LDL reflex    Apolipoprotein B    Lipoprotein A (LPA)    Screening for cervical cancer    Orders:    Ambulatory referral to Obstetrics / Gynecology; Future    Asymptomatic uncomplicated menopause    Orders:    DXA bone density spine hip and pelvis; Future    Need for hepatitis C screening test    Orders:    Hepatitis C Antibody; Future    Screening for HIV (human immunodeficiency virus)    Orders:    HIV 1/2 AG/AB w Reflex [facility] for 2 yr old and above; Future       COUNSELING    Alcohol/drug use: discussed moderation in alcohol intake, the recommendations for healthy alcohol use, and avoidance of illicit drug use.  Dental Health: discussed importance of regular tooth brushing, flossing, and dental visits.  Injury prevention: discussed safety/seat belts,  safety helmets, smoke detectors, carbon monoxide detectors, and smoking near bedding or upholstery.  Sexual health: discussed sexually transmitted diseases, partner selection, use of condoms, avoidance of unintended pregnancy, and contraceptive alternatives.  Exercise: the importance of regular exercise/physical activity was discussed. Recommend exercise 3-5 times per week for at least 30 minutes.      HEALTH MAINTENANCE     Immunization History   Administered Date(s) Administered    COVID-19 PFIZER VACCINE 0.3 ML IM 04/15/2021, 05/11/2021    INFLUENZA 11/04/2016, 11/03/2017, 11/26/2019, 12/16/2020, 10/24/2023, 10/24/2023    Influenza, seasonal, injectable, preservative free 10/05/2012, 11/17/2014, 01/13/2016, 10/28/2021    Tdap 10/05/2012, 09/11/2023    Tuberculin Skin Test-PPD Intradermal 02/08/2015, 11/03/2017    Zoster Vaccine Recombinant 05/05/2022, 09/23/2022     Pap smear:Not on file  Mammogram:02/02/2024   Colonoscopy:04/15/2024 04/15/2024  Cologuard:Not on file   DEXA scan:Not on file      FOLLOW UP   Return in about 3 months (around 2/22/2025) for after completion of labs.    CURRENT MEDICATIONS     Current Outpatient Medications:     budesonide-formoterol (SYMBICORT) 80-4.5 MCG/ACT inhaler, Inhale 2 puffs 2 (two) times a day Rinse mouth after use., Disp: 30.6 g, Rfl: 3    Citalopram Hydrobromide (CELEXA PO), Take 30 mg by mouth daily, Disp: , Rfl:     [facility] Vitamin B-12 1000 MCG tablet, Take 1,000 mcg by mouth daily, Disp: , Rfl:     Iron, Ferrous Sulfate, 75 (15 Fe) MG/ML SOLN, , Disp: , Rfl:     lamoTRIgine (LaMICtal) 100 mg tablet, Take 100 mg by mouth 2 (two) times a day, Disp: , Rfl:     LORazepam (ATIVAN) 0.5 mg tablet, Take 0.5 mg by mouth 2 (two) times a day as needed, Disp: , Rfl:     omeprazole (PriLOSEC) 40 MG capsule, TAKE 1 CAPSULE (40 MG TOTAL) BY MOUTH 2 (TWO) TIMES A DAY 30-60 MIN BEFORE BREAKFAST AND 30-60 MINUTES BEFORE DINNER, OPEN CAPSULES AND TAKE WITH WATER IMMEDIATELY, Disp: 180  capsule, Rfl: 1    ANNUAL PHYSICAL QUESTIONNAIRE    History of Present Illness     Perri Cortes is a 64 y.o. who is here for annual physical exam.  History obtained from : patient  Perri Cortes reports living with .   Eating disorder whole life, history of bariatric surgery   Eduction/Work: financial annalist.          Concerns today: yes    Diet and Physical Activity  Diet: well balanced diet  Exercise: walking, moderate cardiovascular exercise, and 3-4 times a week on average  Do you struggle with your weight? no    General Health  Sleep: gets 7-8 hours of sleep on average and waking up because of cough.     Hearing: decreased - bilateral  Vision: previous LASIK surgery  Dental: regular dental visits and brushes teeth twice daily    Mental Health  PHQ-2/9 Depression Screening    Little interest or pleasure in doing things: 0 - not at all  Feeling down, depressed, or hopeless: 0 - not at all  Trouble falling or staying asleep, or sleeping too much: 2 - more than half the days  Feeling tired or having little energy: 1 - several days  Poor appetite or overeatin - several days  Feeling bad about yourself - or that you are a failure or have let yourself or your family down: 0 - not at all  Trouble concentrating on things, such as reading the newspaper or watching television: 1 - several days  Moving or speaking so slowly that other people could have noticed. Or the opposite - being so fidgety or restless that you have been moving around a lot more than usual: 0 - not at all  Thoughts that you would be better off dead, or of hurting yourself in some way: 0 - not at all  PHQ-9 Score: 5  PHQ-9 Interpretation: Mild depression       Anxiety: yes        ALLERGIES      Allergies   Allergen Reactions    Methocarbamol Hives    Penicillins Abdominal Pain, Anxiety, Dizziness, Hallucinations, Headache, Hives, Itching, Rash and Swelling       PAST MEDICAL & SURGICAL HISTORY      Past Medical History:   Diagnosis Date     "Acid reflux 2024    ADHD     Anemia     Anxiety     Anxiety and depression     Depression     Eating disorder     Hernia 2012    Surgery    Iron malabsorption     Obesity      Past Surgical History:   Procedure Laterality Date    APPENDECTOMY      BARIATRIC SURGERY      COLONOSCOPY      ENDOMETRIAL ABLATION      HERNIA REPAIR         FAMILY HISTORY & SOCIAL HISTORY      Family History   Problem Relation Age of Onset    Diabetes Mother     Arthritis Mother     Asthma Mother     Hearing loss Mother     Hypertension Mother     Colon cancer Father     Addiction problem Father     Cancer Father     Hypertension Father     Colon cancer Maternal Grandmother     Depression Sister     Hearing loss Sister     Depression Sister       Social History     Tobacco Use    Smoking status: Former     Current packs/day: 0.00     Average packs/day: 0.5 packs/day for 5.0 years (2.5 ttl pk-yrs)     Types: Cigarettes     Quit date: 1985     Years since quittin.9    Smokeless tobacco: Never   Vaping Use    Vaping status: Never Used   Substance and Sexual Activity    Alcohol use: Not Currently     Comment: Less than 12 a year    Drug use: Not Currently    Sexual activity: Not Currently     Partners: Male     Birth control/protection: None     Comment: No libido        OBJECTIVES      /76 (BP Location: Right arm, Patient Position: Sitting, Cuff Size: Standard)   Pulse 75   Temp (!) 96.6 °F (35.9 °C) (Tympanic)   Resp 18   Ht 5' 2\" (1.575 m)   Wt 63.6 kg (140 lb 3.2 oz)   SpO2 98%   BMI 25.64 kg/m²    Physical Exam  Vitals reviewed.   Constitutional:       General: She is not in acute distress.     Appearance: Normal appearance. She is not ill-appearing, toxic-appearing or diaphoretic.   HENT:      Head: Normocephalic and atraumatic.      Right Ear: External ear normal.      Left Ear: External ear normal.      Nose: Nose normal.      Mouth/Throat:      Mouth: Mucous membranes are moist.   Eyes:      General: No " scleral icterus.        Right eye: No discharge.         Left eye: No discharge.      Extraocular Movements: Extraocular movements intact.      Conjunctiva/sclera: Conjunctivae normal.   Cardiovascular:      Rate and Rhythm: Normal rate and regular rhythm.      Pulses: Normal pulses.      Heart sounds: Normal heart sounds.   Pulmonary:      Effort: Pulmonary effort is normal. No respiratory distress.      Breath sounds: Normal breath sounds.   Abdominal:      Palpations: Abdomen is soft.      Tenderness: There is no abdominal tenderness.   Musculoskeletal:         General: No swelling. Normal range of motion.      Cervical back: Normal range of motion.   Skin:     General: Skin is warm and dry.   Neurological:      General: No focal deficit present.      Mental Status: She is alert and oriented to person, place, and time.   Psychiatric:         Mood and Affect: Mood normal.         Behavior: Behavior normal.         Thought Content: Thought content normal.           Babak Eduardo MD  Family Medicine Physician   [facility] [place]      Administrative Statements     Medications have been reviewed by provider in current encounter

## 2024-11-22 NOTE — ASSESSMENT & PLAN NOTE
Following GI, currently on omeprazole 40 mg twice daily.  Previously tried inhaler during initial journey of persistent cough and nighttime.  Recommended trying an inhaler again.  Sent in prescription for Symbicort.  Continue care with specialist, return in 3 months  Recommended magnesium oxide, probiotics  Orders:    budesonide-formoterol (SYMBICORT) 80-4.5 MCG/ACT inhaler; Inhale 2 puffs 2 (two) times a day Rinse mouth after use.

## 2024-12-27 ENCOUNTER — OFFICE VISIT (OUTPATIENT)
Dept: GASTROENTEROLOGY | Facility: CLINIC | Age: 64
End: 2024-12-27
Payer: COMMERCIAL

## 2024-12-27 VITALS
SYSTOLIC BLOOD PRESSURE: 122 MMHG | OXYGEN SATURATION: 98 % | BODY MASS INDEX: 25.76 KG/M2 | TEMPERATURE: 97.4 F | DIASTOLIC BLOOD PRESSURE: 78 MMHG | WEIGHT: 140 LBS | HEIGHT: 62 IN | HEART RATE: 78 BPM | RESPIRATION RATE: 18 BRPM

## 2024-12-27 DIAGNOSIS — K21.9 LARYNGOPHARYNGEAL REFLUX (LPR): ICD-10-CM

## 2024-12-27 DIAGNOSIS — D50.9 IRON DEFICIENCY ANEMIA, UNSPECIFIED IRON DEFICIENCY ANEMIA TYPE: Primary | ICD-10-CM

## 2024-12-27 DIAGNOSIS — K21.9 GASTROESOPHAGEAL REFLUX DISEASE, UNSPECIFIED WHETHER ESOPHAGITIS PRESENT: ICD-10-CM

## 2024-12-27 PROCEDURE — 99214 OFFICE O/P EST MOD 30 MIN: CPT | Performed by: PHYSICIAN ASSISTANT

## 2024-12-27 RX ORDER — CITALOPRAM HYDROBROMIDE 10 MG/1
1 TABLET ORAL DAILY
COMMUNITY
Start: 2024-12-05

## 2024-12-27 RX ORDER — DOXEPIN 6 MG/1
TABLET, FILM COATED ORAL
COMMUNITY
Start: 2024-12-16

## 2024-12-27 NOTE — ASSESSMENT & PLAN NOTE
Recent labs show normal hemoglobin and ferritin.  She is status post iron infusions ordered by [facility] hematology.  Patient reports that she has a follow-up with them in January, if at any point she begins having signs or GI bleeding or symptomatic anemia, we can schedule repeat video capsule endoscopy.  She agrees with plan and will continue to follow-up with her hematologist.

## 2024-12-27 NOTE — PROGRESS NOTES
Name: Perri Cortes      : 1960      MRN: [id number]  Encounter Provider: Anahi Aguila PA-C  Encounter Date: 2024   Encounter department: [facility] [place]  :  Assessment & Plan  Iron deficiency anemia, unspecified iron deficiency anemia type  Recent labs show normal hemoglobin and ferritin.  She is status post iron infusions ordered by [facility] hematology.  Patient reports that she has a follow-up with them in January, if at any point she begins having signs or GI bleeding or symptomatic anemia, we can schedule repeat video capsule endoscopy.  She agrees with plan and will continue to follow-up with her hematologist.  Laryngopharyngeal reflux (LPR)  Chronic cough improved.  Gastroesophageal reflux disease, unspecified whether esophagitis present  We will have the patient wean down her omeprazole to once daily, likely she will continue her dosage in the morning since it is easier for her to remember to take.  DEXA scan is scheduled.      History of Present Illness   HPI  Perri Cortes is a 64 y.o. female with history of Flavio-en-Y bypass, anxiety, GERD and iron deficiency is on iron infusions at [facility] who presents for follow-up.  I initially saw the patient in October after she underwent endoscopic evaluation by Dr. Thakkar in 2024.    During her last follow-up, we decided to switch her off of pantoprazole to omeprazole secondary to GE junction erosion.  Patient reports she has been doing well overall.  Has occasional fatigue.  Denies signs of GI bleeding.  Has no new complaints at this time.  Reports that her PCP recommended probiotic for bloating.  We also went over the low FODMAP diet.    Patient underwent labs in November with hemoglobin of 13.8 and ferritin of 124.    Last EGD and colonoscopy were performed by Dr. Thakkar. EGD with mild erosion in the GE junction otherwise the remainder exam appears to have been normal.  Colonoscopy was normal up to the terminal ileum. Was recommended a 5-year recall. She then had a capsule endoscopy, but the prep was suboptimal.  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        Review of Systems   Constitutional:  Positive for fatigue. Negative for appetite change, chills, diaphoresis and unexpected weight change.   HENT:  Negative for sore throat and trouble swallowing.    Eyes:  Negative for discharge and redness.   Respiratory:  Negative for cough, shortness of breath and wheezing.    Cardiovascular:  Negative for chest pain and palpitations.   Gastrointestinal:  Negative for abdominal pain, anal bleeding, blood in stool, constipation, diarrhea, nausea, rectal pain and vomiting.   Endocrine: Negative for cold intolerance and heat intolerance.   Musculoskeletal:  Negative for joint swelling and myalgias.   Skin:  Negative for pallor and rash.   Neurological:  Negative for dizziness, tremors, weakness, light-headedness, numbness and headaches.   Hematological:  Negative for adenopathy. Does not bruise/bleed easily.   Psychiatric/Behavioral:  Negative for behavioral problems, confusion, dysphoric mood and sleep disturbance. The patient is not nervous/anxious.           Objective   There were no vitals taken for this visit.     Physical Exam  Constitutional:       General: She is not in acute distress.     Appearance: She is well-developed. She is not diaphoretic.   HENT:      Head: Normocephalic and atraumatic.   Eyes:      General: No scleral icterus.     Conjunctiva/sclera: Conjunctivae normal.      Pupils: Pupils are equal, round, and reactive to light.   Neck:      Thyroid: No thyromegaly.      Trachea: No tracheal deviation.   Cardiovascular:      Rate and Rhythm: Normal rate and regular rhythm.   Pulmonary:      Effort: Pulmonary effort is normal.      Breath sounds: Normal breath sounds. No wheezing or rales.   Abdominal:      General: Bowel sounds are  normal. There is no distension.      Palpations: Abdomen is soft. Abdomen is not rigid. There is no mass.      Tenderness: There is no abdominal tenderness. There is no guarding or rebound.   Musculoskeletal:      Cervical back: Neck supple.   Lymphadenopathy:      Cervical: No cervical adenopathy.   Skin:     General: Skin is warm and dry.      Findings: No erythema or rash.   Neurological:      Mental Status: She is alert and oriented to person, place, and time.   Psychiatric:         Behavior: Behavior normal.

## 2024-12-30 ENCOUNTER — RESULTS FOLLOW-UP (OUTPATIENT)
Age: 64
End: 2024-12-30

## 2024-12-30 ENCOUNTER — HOSPITAL ENCOUNTER (OUTPATIENT)
Age: 64
Discharge: HOME | End: 2024-12-30
Payer: COMMERCIAL

## 2024-12-30 ENCOUNTER — APPOINTMENT (OUTPATIENT)
Age: 64
End: 2024-12-30
Payer: COMMERCIAL

## 2024-12-30 VITALS — HEIGHT: 61 IN | WEIGHT: 139 LBS | BODY MASS INDEX: 26.24 KG/M2

## 2024-12-30 DIAGNOSIS — Z11.4 SCREENING FOR HIV (HUMAN IMMUNODEFICIENCY VIRUS): ICD-10-CM

## 2024-12-30 DIAGNOSIS — Z78.0 ASYMPTOMATIC UNCOMPLICATED MENOPAUSE: ICD-10-CM

## 2024-12-30 DIAGNOSIS — Z11.59 NEED FOR HEPATITIS C SCREENING TEST: ICD-10-CM

## 2024-12-30 LAB
25(OH)D3 SERPL-MCNC: 44 NG/ML (ref 30–100)
CHOLEST SERPL-MCNC: 196 MG/DL (ref ?–200)
HCV AB SER QL: NORMAL
HDLC SERPL-MCNC: 64 MG/DL
HIV 1+2 AB+HIV1 P24 AG SERPL QL IA: NORMAL
HIV 2 AB SERPL QL IA: NORMAL
HIV1 AB SERPL QL IA: NORMAL
HIV1 P24 AG SERPL QL IA: NORMAL
LDLC SERPL CALC-MCNC: 114 MG/DL (ref 0–100)
T4 FREE SERPL-MCNC: 0.85 NG/DL (ref 0.61–1.12)
TRIGL SERPL-MCNC: 92 MG/DL (ref ?–150)
TSH SERPL DL<=0.05 MIU/L-ACNC: 2.25 UIU/ML (ref 0.45–4.5)

## 2024-12-30 PROCEDURE — 77080 DXA BONE DENSITY AXIAL: CPT

## 2024-12-30 PROCEDURE — 36415 COLL VENOUS BLD VENIPUNCTURE: CPT

## 2024-12-30 PROCEDURE — 87389 HIV-1 AG W/HIV-1&-2 AB AG IA: CPT

## 2024-12-30 PROCEDURE — 86803 HEPATITIS C AB TEST: CPT

## 2024-12-31 LAB — LPA SERPL-SCNC: <9 NMOL/L

## 2025-01-01 LAB — APO B SERPL-MCNC: 85 MG/DL

## 2025-01-03 ENCOUNTER — OFFICE VISIT (OUTPATIENT)
Dept: BARIATRICS | Facility: CLINIC | Age: 65
End: 2025-01-03
Payer: COMMERCIAL

## 2025-01-03 VITALS
BODY MASS INDEX: 26.24 KG/M2 | DIASTOLIC BLOOD PRESSURE: 76 MMHG | HEIGHT: 61 IN | WEIGHT: 139 LBS | SYSTOLIC BLOOD PRESSURE: 122 MMHG | HEART RATE: 90 BPM | OXYGEN SATURATION: 98 %

## 2025-01-03 DIAGNOSIS — Z98.84 HISTORY OF GASTRIC BYPASS: ICD-10-CM

## 2025-01-03 PROCEDURE — 99203 OFFICE O/P NEW LOW 30 MIN: CPT | Performed by: STUDENT IN AN ORGANIZED HEALTH CARE EDUCATION/TRAINING PROGRAM

## 2025-01-03 NOTE — PROGRESS NOTES
OFFICE VISIT - BARIATRIC SURGERY  Perri Cortes 64 y.o. female MRN: [id number]  Unit/Bed#:  Encounter: [id number]      HPI:  Perri Cortes is a 64 y.o. female status post laparoscopic RNYGB at [place] in 2001. Comes to the office today to establish care.    Subjective     At the time of their surgery the patient was about 240 lbs, and was able to drop down as low as 120. Today, their weight is 139, with a BMI of 26.05.      She states that for several years, she has experienced intermittent lower abdominal pain. The pain usually starts after eating, and is described as moderate to severe. The pain typically resolves before she has a bowel movement. She does not vomit. She has undergone EGD/Colonoscopy as well as capsule endoscopy which were normal. She adds that she has not consistently taken her bariatric multivitamins.       Review of Systems   Constitutional:  Negative for chills and fever.   HENT:  Negative for ear pain and sore throat.    Eyes:  Negative for pain and visual disturbance.   Respiratory:  Negative for cough and shortness of breath.    Cardiovascular:  Negative for chest pain and palpitations.   Gastrointestinal:  Positive for abdominal pain (intermittent). Negative for vomiting.   Genitourinary:  Negative for dysuria and hematuria.   Musculoskeletal:  Negative for arthralgias and back pain.   Skin:  Negative for color change and rash.   Neurological:  Negative for seizures and syncope.   All other systems reviewed and are negative.      Historical Information   Past Medical History:   Diagnosis Date    Acid reflux 11/2024    ADHD     Anemia     Anxiety     Anxiety and depression     Depression     Eating disorder     Hernia 2012    Surgery    Iron malabsorption     Obesity      Past Surgical History:   Procedure Laterality Date    APPENDECTOMY      BARIATRIC SURGERY      COLONOSCOPY      ENDOMETRIAL ABLATION      HERNIA REPAIR       Social History   Social History     Substance and  "Sexual Activity   Alcohol Use Not Currently    Comment: Less than 12 a year     Social History     Substance and Sexual Activity   Drug Use Not Currently     Social History     Tobacco Use   Smoking Status Former    Current packs/day: 0.00    Average packs/day: 0.5 packs/day for 5.0 years (2.5 ttl pk-yrs)    Types: Cigarettes    Quit date: 1985    Years since quittin.0   Smokeless Tobacco Never       Objective       Current Vitals:   Blood Pressure: 122/76 (25 1021)  Pulse: 90 (25 1021)  Height: 5' 1.25\" (155.6 cm) (25 1021)  Weight - Scale: 63 kg (139 lb) (25 1021)  SpO2: 98 % (25 1021)    Invasive Devices       None                   Physical Exam  Vitals reviewed.   Constitutional:       General: She is not in acute distress.     Appearance: Normal appearance. She is not ill-appearing.   HENT:      Head: Normocephalic and atraumatic.      Nose: Nose normal.      Mouth/Throat:      Mouth: Mucous membranes are moist.      Pharynx: Oropharynx is clear.   Eyes:      Extraocular Movements: Extraocular movements intact.      Conjunctiva/sclera: Conjunctivae normal.   Cardiovascular:      Rate and Rhythm: Normal rate.   Pulmonary:      Effort: Pulmonary effort is normal. No respiratory distress.   Abdominal:      General: There is no distension.      Palpations: Abdomen is soft.      Tenderness: There is no abdominal tenderness. There is no guarding or rebound.      Comments: Well healed incisions from prior surgeries   Musculoskeletal:         General: No deformity. Normal range of motion.      Cervical back: Normal range of motion and neck supple.   Skin:     General: Skin is warm and dry.      Coloration: Skin is not jaundiced.   Neurological:      General: No focal deficit present.      Mental Status: She is alert and oriented to person, place, and time.   Psychiatric:         Mood and Affect: Mood normal.         Thought Content: Thought content normal.           Pathology, " and Other Studies: Results Review Statement: No pertinent imaging studies reviewed.      Assessment/PLAN:    Perri Cortes is a 64 y.o. female status post laparoscopic RNYGB at [place] in 2001.    Discussed with the patient that her symptoms of intermittent abdominal pain raise concern for a chronic internal hernia. Will plan to further evaluate this with a CT scan of the abdomen/pelvis. Discussed with her that a CT scan may not always catch an internal hernia even if that is the etiology of her pain. She is in agreement with the plan. She would also like to see our dietitians.    --------------------------------------------------------------------  - CT abd/pel to eval for chronic internal hernia  - Follow up after this has resulted to discuss the next steps  - Recommend taking Bariatric multivitamins  - Follow up with dietitians              Misbah Peace MD  Bariatric Surgery  1/3/2025  11:44 AM

## 2025-01-12 ENCOUNTER — OFFICE VISIT (OUTPATIENT)
Age: 65
End: 2025-01-12
Payer: COMMERCIAL

## 2025-01-12 VITALS
WEIGHT: 137 LBS | DIASTOLIC BLOOD PRESSURE: 69 MMHG | SYSTOLIC BLOOD PRESSURE: 120 MMHG | TEMPERATURE: 97.1 F | RESPIRATION RATE: 18 BRPM | BODY MASS INDEX: 25.68 KG/M2 | HEART RATE: 66 BPM | OXYGEN SATURATION: 96 %

## 2025-01-12 DIAGNOSIS — R10.30 LOWER ABDOMINAL PAIN: Primary | ICD-10-CM

## 2025-01-12 LAB
SL AMB  POCT GLUCOSE, UA: ABNORMAL
SL AMB LEUKOCYTE ESTERASE,UA: ABNORMAL
SL AMB POCT BILIRUBIN,UA: ABNORMAL
SL AMB POCT BLOOD,UA: ABNORMAL
SL AMB POCT CLARITY,UA: CLEAR
SL AMB POCT COLOR,UA: YELLOW
SL AMB POCT KETONES,UA: ABNORMAL
SL AMB POCT NITRITE,UA: ABNORMAL
SL AMB POCT PH,UA: ABNORMAL
SL AMB POCT URINE PROTEIN: ABNORMAL
SL AMB POCT UROBILINOGEN: ABNORMAL

## 2025-01-12 PROCEDURE — 99213 OFFICE O/P EST LOW 20 MIN: CPT | Performed by: PHYSICIAN ASSISTANT

## 2025-01-12 PROCEDURE — 81002 URINALYSIS NONAUTO W/O SCOPE: CPT | Performed by: PHYSICIAN ASSISTANT

## 2025-01-12 PROCEDURE — 87086 URINE CULTURE/COLONY COUNT: CPT | Performed by: PHYSICIAN ASSISTANT

## 2025-01-12 RX ORDER — NITROFURANTOIN 25; 75 MG/1; MG/1
100 CAPSULE ORAL 2 TIMES DAILY
Qty: 10 CAPSULE | Refills: 0 | Status: SHIPPED | OUTPATIENT
Start: 2025-01-12 | End: 2025-01-17

## 2025-01-12 NOTE — PROGRESS NOTES
[facility]        NAME: Perri Cortes is a 65 y.o. female  : 1960    MRN: [id number]  DATE: 2025  TIME: 11:49 AM    Assessment and Plan   Lower abdominal pain [R10.30]  1. Lower abdominal pain  POCT urine dip    Urine culture    nitrofurantoin (MACROBID) 100 mg capsule            Patient Instructions     U/A unremarkable  Urine Cx sent  Macrobid twice daily for 5 days   Increase fluids   Check your MyChart for result    Follow up with PCP in 3-5 days.  Proceed to  ER if symptoms worsen.    If tests are performed, our office will contact you with results only if changes need to made to the care plan discussed with you at the visit. You can review your full results on [facility].    Chief Complaint     Chief Complaint   Patient presents with    Abdominal Pain     Patient states she has major abd pain that is shooting to her back that began yesterday.         History of Present Illness       Patient states she has major abd pain that is shooting to her back that began yesterday.        Abdominal Pain  This is a new problem. The current episode started yesterday. The onset quality is gradual. The problem occurs intermittently. The most recent episode lasted 1 day. The problem has been gradually worsening. The pain is located in the suprapubic region. The pain is at a severity of 3/10. The pain is mild. The quality of the pain is aching. The abdominal pain radiates to the back. Associated symptoms include frequency. Pertinent negatives include no anorexia, belching, constipation, diarrhea, dysuria, fever, flatus, headaches, hematochezia, hematuria, melena, myalgias, nausea or vomiting. Nothing aggravates the pain. The pain is relieved by Nothing. She has tried antacids for the symptoms. The treatment provided no relief.       Review of Systems   Review of Systems   Constitutional:  Negative for fever.   Gastrointestinal:  Positive for abdominal pain. Negative for anorexia,  constipation, diarrhea, flatus, hematochezia, melena, nausea and vomiting.   Genitourinary:  Positive for frequency. Negative for dysuria and hematuria.   Musculoskeletal:  Negative for myalgias.   Neurological:  Negative for headaches.         Current Medications       Current Outpatient Medications:     nitrofurantoin (MACROBID) 100 mg capsule, Take 1 capsule (100 mg total) by mouth 2 (two) times a day for 5 days, Disp: 10 capsule, Rfl: 0    citalopram (CeleXA) 10 mg tablet, Take 1 tablet by mouth in the morning (Patient not taking: Reported on 12/27/2024), Disp: , Rfl:     Citalopram Hydrobromide (CELEXA PO), Take 30 mg by mouth daily, Disp: , Rfl:     [employer] Vitamin B-12 1000 MCG tablet, Take 1,000 mcg by mouth daily, Disp: , Rfl:     Doxepin HCl 6 MG TABS, TAKE 1/2 TO 1 TABLET DAILY AT BEDTIME, Disp: , Rfl:     Iron, Ferrous Sulfate, 75 (15 Fe) MG/ML SOLN, , Disp: , Rfl:     lamoTRIgine (LaMICtal) 100 mg tablet, Take 100 mg by mouth 2 (two) times a day, Disp: , Rfl:     LORazepam (ATIVAN) 0.5 mg tablet, Take 0.5 mg by mouth 2 (two) times a day as needed, Disp: , Rfl:     mometasone-formoterol (DULERA) 100-5 MCG/ACT inhaler, Inhale 2 puffs 2 (two) times a day Rinse mouth after use., Disp: 39 g, Rfl: 3    omeprazole (PriLOSEC) 40 MG capsule, TAKE 1 CAPSULE (40 MG TOTAL) BY MOUTH 2 (TWO) TIMES A DAY 30-60 MIN BEFORE BREAKFAST AND 30-60 MINUTES BEFORE DINNER, OPEN CAPSULES AND TAKE WITH WATER IMMEDIATELY, Disp: 180 capsule, Rfl: 1    Current Allergies     Allergies as of 01/12/2025 - Reviewed 01/12/2025   Allergen Reaction Noted    Methocarbamol Hives 04/02/2024    Penicillins Abdominal Pain, Anxiety, Dizziness, Hallucinations, Headache, Hives, Itching, Rash, and Swelling 01/14/1985            The following portions of the patient's history were reviewed and updated as appropriate: allergies, current medications, past family history, past medical history, past social history, past surgical history and problem list.      Past Medical History:   Diagnosis Date    Acid reflux 11/2024    ADHD     Anemia     Anxiety     Anxiety and depression     Depression     Eating disorder     Hernia 2012    Surgery    Iron malabsorption     Obesity        Past Surgical History:   Procedure Laterality Date    APPENDECTOMY      BARIATRIC SURGERY      COLONOSCOPY      ENDOMETRIAL ABLATION      HERNIA REPAIR         Family History   Problem Relation Age of Onset    Diabetes Mother     Arthritis Mother     Asthma Mother     Hearing loss Mother     Hypertension Mother     Colon cancer Father     Addiction problem Father     Cancer Father     Hypertension Father     Colon cancer Maternal Grandmother     Depression Sister     Hearing loss Sister     Depression Sister          Medications have been verified.        Objective   /69   Pulse 66   Temp (!) 97.1 °F (36.2 °C)   Resp 18   Wt 62.1 kg (137 lb)   SpO2 96%   BMI 25.68 kg/m²        Physical Exam     Physical Exam  Vitals and nursing note reviewed.   Constitutional:       General: She is not in acute distress.     Appearance: Normal appearance. She is not ill-appearing, toxic-appearing or diaphoretic.   Eyes:      General: No scleral icterus.     Extraocular Movements: Extraocular movements intact.      Conjunctiva/sclera: Conjunctivae normal.      Pupils: Pupils are equal, round, and reactive to light.   Cardiovascular:      Rate and Rhythm: Normal rate and regular rhythm.      Pulses: Normal pulses.   Pulmonary:      Effort: Pulmonary effort is normal. No respiratory distress.   Abdominal:      Tenderness: There is no right CVA tenderness or left CVA tenderness.   Musculoskeletal:         General: Normal range of motion.      Cervical back: Normal range of motion.   Lymphadenopathy:      Cervical: No cervical adenopathy.   Skin:     General: Skin is warm and dry.      Capillary Refill: Capillary refill takes less than 2 seconds.   Neurological:      General: No focal deficit present.       Mental Status: She is alert and oriented to person, place, and time.      Coordination: Coordination normal.      Gait: Gait normal.   Psychiatric:         Mood and Affect: Mood normal.         Behavior: Behavior normal.         Thought Content: Thought content normal.         Judgment: Judgment normal.

## 2025-01-12 NOTE — PATIENT INSTRUCTIONS
"Patient Education    U/A unremarkable  Urine Cx sent  Macrobid twice daily for 5 days   Increase fluids   Check your MyChart for result    Follow up with PCP in 3-5 days.  Proceed to  ER if symptoms worsen.    If tests are performed, our office will contact you with results only if changes need to made to the care plan discussed with you at the visit. You can review your full results on [facility].     Urinary tract infection - Discharge instructions   The Basics   Written by the doctors and editors at [facility]   What are discharge instructions? -- Discharge instructions are information about how to take care of yourself after getting medical care for a health problem.  What is a urinary tract infection? -- A urinary tract infection (\"UTI\") is an infection that affects either the bladder or the kidneys (figure 1). A kidney infection is more serious, and can lead to other serious problems if it is not treated properly.  You need to take antibiotics to treat a UTI. It is important to take all of your antibiotics, even if you start to feel better.  How do I care for myself at home? -- Ask the doctor or nurse what you should do when you go home. Make sure that you understand exactly what you need to do to care for yourself. Ask questions if there is anything you do not understand.  You should also:   Take all of your medicines as instructed.   Take phenazopyridine (sample brand name: AZO Urinary Pain Relief) for the first day or so, if you choose. This is an over-the-counter medicine. It will help numb your bladder and decrease the urge to urinate. This medicine causes your urine and tears to look orange.   Take acetaminophen (sample brand name: Tylenol) if needed for pain.   Drink extra fluids. This can help prevent more bladder infections. If you have sex, these things might also help:   Urinate right afterward.   If you use birth control, use a form that does not contain spermicide.  When should I call the " doctor? -- Call for advice if:   You have pain in your back, shoulder, or belly.   You have a fever, shaking chills, or sweats even though you are taking antibiotics.   You notice more blood in your urine.   Your symptoms get worse or do not get better within 24 hours of starting antibiotics.   Your symptoms come back after finishing treatment.   You have any new or worrying symptoms.  All topics are updated as new evidence becomes available and our peer review process is complete.  This topic retrieved from [employer] on: Feb 26, 2024.  Topic 005115 Version 1.0  Release: 32.2.4 - C32.56  © 2024 UpToDate, Inc. and/or its affiliates. All rights reserved.  figure 1: Anatomy of the urinary tract     Urine is made by the kidneys. It passes from the kidneys into the bladder through 2 tubes called the ureters. Then, it leaves the bladder through another tube called the urethra.  Graphic 12565 Version 8.0  Consumer Information Use and Disclaimer   Disclaimer: This generalized information is a limited summary of diagnosis, treatment, and/or medication information. It is not meant to be comprehensive and should be used as a tool to help the user understand and/or assess potential diagnostic and treatment options. It does NOT include all information about conditions, treatments, medications, side effects, or risks that may apply to a specific patient. It is not intended to be medical advice or a substitute for the medical advice, diagnosis, or treatment of a health care provider based on the health care provider's examination and assessment of a patient's specific and unique circumstances. Patients must speak with a health care provider for complete information about their health, medical questions, and treatment options, including any risks or benefits regarding use of medications. This information does not endorse any treatments or medications as safe, effective, or approved for treating a specific patient. UpToDate, Inc. and  its affiliates disclaim any warranty or liability relating to this information or the use thereof.The use of this information is governed by the Terms of Use, available at https://www.wolterskluwer.com/en/know/clinical-effectiveness-terms. 2024© [employer] and its affiliates and/or licensors. All rights reserved.  Copyright   © 2024 [employer] and/or its affiliates. All rights reserved.

## 2025-01-14 ENCOUNTER — TELEPHONE (OUTPATIENT)
Age: 65
End: 2025-01-14

## 2025-01-14 ENCOUNTER — RESULTS FOLLOW-UP (OUTPATIENT)
Age: 65
End: 2025-01-14

## 2025-01-14 LAB — BACTERIA UR CULT: NORMAL

## 2025-01-14 NOTE — TELEPHONE ENCOUNTER
Patient called looking for urine culture results. She states her pain level has not subsided and the current antibiotic from UC does not seem to be helping.

## 2025-01-15 ENCOUNTER — TELEPHONE (OUTPATIENT)
Dept: BARIATRICS | Facility: CLINIC | Age: 65
End: 2025-01-15

## 2025-01-15 ENCOUNTER — TELEPHONE (OUTPATIENT)
Age: 65
End: 2025-01-15

## 2025-01-15 NOTE — TELEPHONE ENCOUNTER
Patient of . Has CT scheduled for 1/22/25. Was seen in the office on 1/3/25 for evaluation of constant lower abdominal pain that she experiences after meals. Calling to state the pains are getting worse and she is hoping to get sooner appointment for CT. She is also asking it there are any other tests that should be done? Please advise

## 2025-01-15 NOTE — TELEPHONE ENCOUNTER
[facility] called because patient is looking to schedule her CT ordered by Dr Peace with them. They are asking us to fax them a copy of the script and to make sure the script includes the NPI for the provider on the script. Fax number is [phone]

## 2025-01-16 ENCOUNTER — TELEPHONE (OUTPATIENT)
Age: 65
End: 2025-01-16

## 2025-01-16 NOTE — TELEPHONE ENCOUNTER
Patient called in because she is scheduled for a CT with [facility] on Monday 1/20 however they need her to have some labs done for this test due to the test being ordered with contrast. Patient is asking if we can write this up and fax this to [facility] lab on [address] at [phone]. Patient is asking for a call when faxed so she knows when she can go to get this done.

## 2025-01-17 DIAGNOSIS — Z98.84 HISTORY OF GASTRIC BYPASS: Primary | ICD-10-CM

## 2025-01-17 NOTE — TELEPHONE ENCOUNTER
Patient of Dr Peace returning call.  She stated that labs were never ordered and she is required to have them completed so she can have her CT with contrast.    Patient is currently at the lab and requesting the orders placed as soon as possible.  It is a [facility] lab and she stated they can be faxed to the same place that the CT scan orders were faxed.    Please enter labs  Fax- [phone]

## 2025-01-17 NOTE — TELEPHONE ENCOUNTER
Spoke with patient and notified her that labs have been ordered. She is going to return call with information of where to fax as she has already left the lab and is going to try to have them completed tomorrow.   Please fax to desired location when she returns call.

## 2025-01-20 ENCOUNTER — TELEPHONE (OUTPATIENT)
Age: 65
End: 2025-01-20

## 2025-01-20 NOTE — TELEPHONE ENCOUNTER
Patient called to schedule Review Ct scan follow up with dr Peace. Please call patient at [phone]

## 2025-01-22 ENCOUNTER — TELEPHONE (OUTPATIENT)
Age: 65
End: 2025-01-22

## 2025-01-22 NOTE — TELEPHONE ENCOUNTER
Left detailed message for patient on voice mail.   Abbe Flap (Upper To Lower Lip) Text: The defect of the lower lip was assessed and measured.  Given the location and size of the defect, an Abbe flap was deemed most appropriate.  Using a sterile surgical marker, an appropriate Abbe flap was measured and drawn on the upper lip. Local anesthesia was then infiltrated.  A scalpel was then used to incise the upper lip through and through the skin, vermilion, muscle and mucosa, leaving the flap pedicled on the opposite side.  The flap was then rotated and transferred to the lower lip defect.  The flap was then sutured into place with a three layer technique, closing the orbicularis oris muscle layer with subcutaneous buried sutures, followed by a mucosal layer and an epidermal layer.

## 2025-01-22 NOTE — TELEPHONE ENCOUNTER
Patient called and said she has a rash on torso, upper chest to abdomen area. She says its itchy. Wants to know if she can take benadryl for this allergic reaction?     She also wanted to know if Dr. Eduardo could take a look at her recent CT if she had a moment. Please advise. Thank you!     Perri: [phone]

## 2025-01-23 ENCOUNTER — TELEMEDICINE (OUTPATIENT)
Dept: BARIATRICS | Facility: CLINIC | Age: 65
End: 2025-01-23
Payer: COMMERCIAL

## 2025-01-23 VITALS — BODY MASS INDEX: 25.68 KG/M2 | WEIGHT: 136 LBS | HEIGHT: 61 IN

## 2025-01-23 DIAGNOSIS — Z98.84 HISTORY OF GASTRIC BYPASS: Primary | ICD-10-CM

## 2025-01-23 PROCEDURE — 99213 OFFICE O/P EST LOW 20 MIN: CPT | Performed by: STUDENT IN AN ORGANIZED HEALTH CARE EDUCATION/TRAINING PROGRAM

## 2025-01-23 NOTE — PROGRESS NOTES
Virtual Regular Visit  Name: Perri Cortes      : 1960      MRN: [id number]  Encounter Provider: Misbah Peace MD  Encounter Date: 2025   Encounter department: [facility]NAG[facility] [place]      Verification of patient location:  Patient is located at Home in the following state in which I hold an active license [place] :  Assessment & Plan    Perri Cortes is a 64 y.o. female status post laparoscopic RNYGB at [place] in .  CT findings discussed with her. There is a distended small bowel loop in the midline lower abdomen. Her symptoms continue to raise some concern for a chronic internal hernia. Options discussed with the patient include observation as well as diagnostic laparoscopy for definitive evaluation. She states she would like to proceed with this.    Plan:  - Patient to obtain CT images from [facility]  - Cardiology consult for cardiac clearance  - Continue follow up after these have been completed    Encounter provider Misbah Peace MD    The patient was identified by name and date of birth. Perri Cortes was informed that this is a telemedicine visit and that the visit is being conducted through the Epic Embedded platform. She agrees to proceed..  My office door was closed. No one else was in the room.  She acknowledged consent and understanding of privacy and security of the video platform. The patient has agreed to participate and understands they can discontinue the visit at any time.    Patient is aware this is a billable service.     History of Present Illness     Patient states that since she was last seen, she has continued to experience similar crampy abdominal pain. It has decreased somewhat in severity, but still remains bothersome.      Review of Systems   Constitutional:  Negative for chills and fever.   HENT:  Negative for ear pain, sore throat and trouble swallowing.    Eyes:  Negative for pain and visual disturbance.   Respiratory:  Negative  "for cough and shortness of breath.    Cardiovascular:  Negative for chest pain and palpitations.   Gastrointestinal:  Positive for abdominal pain. Negative for nausea and vomiting.   Genitourinary:  Negative for dysuria and hematuria.   Musculoskeletal:  Negative for arthralgias and back pain.   Skin:  Negative for color change and rash.   Neurological:  Negative for seizures and syncope.   All other systems reviewed and are negative.      Objective   Ht 5' 1.2\" (1.554 m)   Wt 61.7 kg (136 lb) Comment: Virtual visit - Pt stated weight  BMI 25.53 kg/m²     Physical Exam    Visit Time  Total Visit Duration: 30  "

## 2025-01-23 NOTE — PROGRESS NOTES
Date of surgery: 2001  Procedure: RNY gastric bypass  Performing surgeon: LORY    Initial Weight - 139.0 lbs.  Current Weight - 136.0 lbs.  Yunior Weight - 136.0 lbs.  Total Body Weight Loss (EWL) - 3.1 lbs.  EWL% - 45%  TWB% - 2%

## 2025-02-10 DIAGNOSIS — K21.9 LARYNGOPHARYNGEAL REFLUX (LPR): ICD-10-CM

## 2025-02-10 DIAGNOSIS — K22.10 EROSIVE ESOPHAGITIS: ICD-10-CM

## 2025-02-11 RX ORDER — OMEPRAZOLE 40 MG/1
40 CAPSULE, DELAYED RELEASE ORAL 2 TIMES DAILY
Qty: 180 CAPSULE | Refills: 1 | Status: SHIPPED | OUTPATIENT
Start: 2025-02-11

## 2025-02-12 ENCOUNTER — TELEPHONE (OUTPATIENT)
Age: 65
End: 2025-02-12

## 2025-02-12 NOTE — TELEPHONE ENCOUNTER
Patient of Dr Peace calling to follow up about surgical process.  States she has not heard back about what is going on.  Patient would like us to reach out to [facility] to have images from her CT scan transferred into our system. We have the written report but no imaging.    Patient expressed interest in seeing Dr Jemal Curtis for this surgery as he trained with the surgeon who did her RNY in the past. Advised that he is booking out into April for earliest consult appointment.    Patient has Cardiac clearance scheduled for 2/24/25. She was told to schedule follow up with Dr Cain once she had the images and the cardiac clearance    Spoke with [facility] and determined that patient should  a CD of her images and bring them to a [facility] to be scanned into our system.  Spoke with patient and made her aware and she is agreeable to this plan.    She will contact us once the images are scanned in to proceed further.    #[phone]

## 2025-02-17 ENCOUNTER — TELEPHONE (OUTPATIENT)
Age: 65
End: 2025-02-17

## 2025-02-17 NOTE — TELEPHONE ENCOUNTER
Spoke to patient regarding concerns of no return calls from office. Apologized to patient for frustrations and offered to help as best as I can.  Pt reports seeing fellow earlier in January and is looking to have hernia surgery.  Confirmed with patient that a CT scan was completed; pt confirmed.  Pt also confirmed having a cardiology appointment set up for clearance on 2/24.  Patient will obtain CT images prior to appointment with Dr. Cain.     Offered patient with Dr. Cain on 2/25, patient inquired if that was too soon.  We mutually decided to wait another 2 weeks to ensure no additional testing was required. Patient is scheduled on 3/13 @ 11am with Dr. Cain in our [place] office.  Patient aware that the appointment is in [place] and offered no additional concerns at that time.

## 2025-02-17 NOTE — TELEPHONE ENCOUNTER
Patient called regarding her Ct Scan. Warm transferred to [facility] for further assistance.

## 2025-02-17 NOTE — TELEPHONE ENCOUNTER
Patient called to schedule a follow-up with Dr. Hansen and to plan for surgery. I attempted a warm transfer to the [place] office but no one answered. Patient then asked to speak with someone in regards to filing a complaint. As James Naeyogesh is out of the office today, I contacted Anahi Elise who noted she would call the patient back before 1:00 p.m. I relayed this to the patient and she understood and agreed to a call back.

## 2025-02-19 ENCOUNTER — TELEPHONE (OUTPATIENT)
Dept: BARIATRICS | Facility: CLINIC | Age: 65
End: 2025-02-19

## 2025-02-19 NOTE — TELEPHONE ENCOUNTER
Pt has left multiple messages for a return call regarding the imaging for her hernia.  She says that we are telling her that we don't have access to the recent imaging. She brought in the disc, but we have the imaging in the chart.  Not sure where the disconnect was.  Scheduled her for a followup with Dr Hansen to discuss going forward with Hernia surgery as per her discussion with Dr Peace.

## 2025-02-24 ENCOUNTER — OFFICE VISIT (OUTPATIENT)
Dept: CARDIOLOGY CLINIC | Facility: CLINIC | Age: 65
End: 2025-02-24
Payer: COMMERCIAL

## 2025-02-24 VITALS
OXYGEN SATURATION: 97 % | DIASTOLIC BLOOD PRESSURE: 74 MMHG | BODY MASS INDEX: 25.58 KG/M2 | RESPIRATION RATE: 16 BRPM | WEIGHT: 139 LBS | HEART RATE: 71 BPM | SYSTOLIC BLOOD PRESSURE: 118 MMHG | HEIGHT: 62 IN

## 2025-02-24 DIAGNOSIS — Z01.810 PRE-OPERATIVE CARDIOVASCULAR EXAMINATION: Primary | ICD-10-CM

## 2025-02-24 PROCEDURE — 99203 OFFICE O/P NEW LOW 30 MIN: CPT | Performed by: INTERNAL MEDICINE

## 2025-02-24 PROCEDURE — 93000 ELECTROCARDIOGRAM COMPLETE: CPT | Performed by: INTERNAL MEDICINE

## 2025-02-24 NOTE — PROGRESS NOTES
Cardiology Consultation     Perri Sebastian  [id number]  1960  [address] [place]  [address]  [facility] [id number]    Patient presents for cardiovascular risk assessment for abdominal surgery.  Patient does have history of bariatric surgery many years ago in [place].  Was at [facility] in 2001.  Patient has been having some GI issues and is found to have possible chronic internal hernia and is being considered for laparoscopic surgery evaluation.    Patient does not have any history of coronary artery disease or MI in the past.  Patient is otherwise having no symptoms referable to cardiovascular system.  She denies any history of exertional chest pain or shortness of breath.  No history of leg edema orthopnea PND.  No history of presyncope syncope.  She had a cardiac evaluation with a stress test at [facility] in 2023 which was negative for ischemia.  Patient denies any history of smoking.  No history of alcohol use.  She is otherwise very active.    1. Pre-operative cardiovascular examination  Ambulatory referral to Cardiology        Patient Active Problem List   Diagnosis    Allergic rhinitis due to pollen    Anxiety    Bariatric surgery status    Depression    Iron deficiency anemia    Persistent cough     Past Medical History:   Diagnosis Date    Acid reflux 11/2024    ADHD     Anemia 1990    Anxiety 2001    Anxiety and depression     Depression 1990    Eating disorder     GERD (gastroesophageal reflux disease) 2024    Hernia 2012    Surgery    Iron malabsorption     Obesity      Social History     Socioeconomic History    Marital status: /Civil Union     Spouse name: Not on file    Number of children: Not on file    Years of education: Not on file    Highest education level: Not on file   Occupational History    Not on file   Tobacco Use    Smoking status: Former      Current packs/day: 0.00     Average packs/day: 0.5 packs/day for 5.0 years (2.5 ttl pk-yrs)     Types: Cigarettes     Quit date: 1985     Years since quittin.1    Smokeless tobacco: Never   Vaping Use    Vaping status: Never Used   Substance and Sexual Activity    Alcohol use: Not Currently     Comment: Less than 12 a year    Drug use: Not Currently    Sexual activity: Not Currently     Partners: Male     Birth control/protection: None     Comment: No libido   Other Topics Concern    Not on file   Social History Narrative    Not on file     Social Drivers of Health     Financial Resource Strain: Not At Risk (2025)    Received from [facility]    Financial Insecurity     In the last 12 months did you skip medications to save money?: No     In the last 12 months was there a time when you needed to see a doctor but could not because of cost?: No   Food Insecurity: No Food Insecurity (2025)    Received from [facility]    Food Insecurity     In the last 12 months did you ever eat less than you felt you should because there wasn't enough money for food?: No   Transportation Needs: No Transportation Needs (2025)    Received from [facility]    Transportation Needs     In the last 12 months have you ever had to go without healthcare because you didn't have a way to get there?: No   Physical Activity: Not on file   Stress: Not on file   Social Connections: Socially Integrated (2025)    Received from [facility]    Social Connection     Do you often feel lonely?: No   Intimate Partner Violence: Not on file   Housing Stability: Not At Risk (2025)    Received from [facility]    Housing Stability     Are you worried that in the next 2 months you may not have stable housing?: No      Family History   Problem Relation Age of Onset    Diabetes Mother     Arthritis Mother     Asthma Mother     Hearing loss  "Mother     Hypertension Mother     Colon cancer Father     Addiction problem Father     Cancer Father     Hypertension Father     Stroke Father         Was in his 40’s    Colon cancer Maternal Grandmother     Depression Sister     Hearing loss Sister     Depression Sister      Past Surgical History:   Procedure Laterality Date    APPENDECTOMY      BARIATRIC SURGERY      COLONOSCOPY      ENDOMETRIAL ABLATION      HERNIA REPAIR      SLEEVE GASTROPLASTY  2001    [facility]       Current Outpatient Medications:     Citalopram Hydrobromide (CELEXA PO), Take 30 mg by mouth daily, Disp: , Rfl:     [facility] Vitamin B-12 1000 MCG tablet, Take 1,000 mcg by mouth daily, Disp: , Rfl:     Doxepin HCl 6 MG TABS, TAKE 1/2 TO 1 TABLET DAILY AT BEDTIME, Disp: , Rfl:     Iron, Ferrous Sulfate, 75 (15 Fe) MG/ML SOLN, , Disp: , Rfl:     lamoTRIgine (LaMICtal) 100 mg tablet, Take 100 mg by mouth 2 (two) times a day, Disp: , Rfl:     LORazepam (ATIVAN) 0.5 mg tablet, Take 0.5 mg by mouth 2 (two) times a day as needed, Disp: , Rfl:     omeprazole (PriLOSEC) 40 MG capsule, TAKE 1 CAPSULE (40 MG TOTAL) BY MOUTH 2 (TWO) TIMES A DAY 30-60 MIN BEFORE BREAKFAST AND 30-60 MINUTES BEFORE DINNER, OPEN CAPSULES AND TAKE WITH WATER IMMEDIATELY, Disp: 180 capsule, Rfl: 1  Allergies   Allergen Reactions    Contrast [Iodinated Contrast Media] Rash    Methocarbamol Hives    Penicillins Abdominal Pain, Anxiety, Dizziness, Hallucinations, Headache, Hives, Itching, Rash and Swelling     Vitals:    02/24/25 1000   BP: 118/74   BP Location: Left arm   Patient Position: Sitting   Cuff Size: Standard   Pulse: 71   Resp: 16   SpO2: 97%   Weight: 63 kg (139 lb)   Height: 5' 1.5\" (1.562 m)       Labs:  Office Visit on 01/12/2025   Component Date Value    LEUKOCYTE ESTERASE,UA 01/12/2025 trace     NITRITE,UA 01/12/2025 neg     SL AMB POCT UROBILINOGEN 01/12/2025 neg     POCT URINE PROTEIN 01/12/2025 neg      PH,UA 01/12/2025 neg     BLOOD,UA 01/12/2025 non hemo 5-10     " KETONES,UA 01/12/2025 neg     BILIRUBIN,UA 01/12/2025 neg     GLUCOSE, UA 01/12/2025 neg      COLOR,UA 01/12/2025 yellow     CLARITY,UA 01/12/2025 clear     Urine Culture 01/12/2025 No Growth <1000 cfu/mL    Appointment on 12/30/2024   Component Date Value    Hepatitis C Ab 12/30/2024 Non-reactive     HIV-1 p24 Antigen 12/30/2024 Non-Reactive     HIV-1 Antibody 12/30/2024 Non-Reactive     HIV-2 Antibody 12/30/2024 Non-Reactive     HIV Ag-Ab 5th Gen 12/30/2024 Non-Reactive      Imaging: Mammo screening bilateral w 3d and cad  Result Date: 2/7/2025  Narrative: HISTORY: Patient is 65 years old and is seen for a screening mammogram of both breasts performed on 2/7/25. No relevant medical history has been documented for this patient. Surgical history includes breast biopsy. No relevant family history has been documented for this patient. No relevant hormone history has been documented for this patient. FILMS COMPARED: The present examination has been compared to prior imaging studies. MAMMOGRAM FINDINGS: A minimum of two views were obtained. 3D tomosynthesis was performed. Computer-aided detection was utilized by the radiologist in the interpretation of this examination. There are scattered areas of fibroglandular density. No suspicious masses, calcifications or other abnormalities are seen. Stable Left breast nodule Left breast biopsy clips    Impression: There is no mammographic evidence of malignancy. BI-RADS Category:  2 - Benign Return to Screening is recommended. Lifetime Tyrer-Cuzick 8: 3.81% In patients with a documented history of breast cancer, male patients, patients legal sex is unknown or with an age less than 19 or 85 and greater a risk score will not be calculated. Based on the information provided by the patient, risk scores for breast cancer for 5 years, 10 years and lifetime was calculated using both breast density and family history.  Patients should consult with their referring providers regarding the  "significance of the score, potential need for additional risk assessment and supplemental screening including whole breast ultrasound and MRI MRN: [id number]   Patient Name: Perri \"Chey\" Sebastian This exam was performed at: [facility] [address] [place] [id number] [phone] Workstation: [id number]      Review of Systems:  Review of Systems  REVIEW OF SYSTEMS:  Constitutional:  Denies fever or chills   Eyes:  Denies change in visual acuity   HENT:  Denies nasal congestion or sore throat   Respiratory:  Denies cough or shortness of breath   Cardiovascular:  Denies chest pain or edema   :  Denies dysuria, frequency, difficulty in micturition and nocturia  Musculoskeletal:  Denies back pain or joint pain   Neurologic:  Denies headache, focal weakness or sensory changes   Endocrine:  Denies polyuria or polydipsia   Lymphatic:  Denies swollen glands   Psychiatric:  Denies depression or anxiety    Physical Exam:  Physical Exam  PHYSICAL EXAM:  General:  Patient is not in acute distress   Head: Normocephalic, Atraumatic.  HEENT:  Both pupils normal-size atraumatic, normocephalic, nonicteric  Neck:  JVP not raised. Trachea central. No carotid bruit  Respiratory:  normal breath sounds no crackles. no rhonchi  Cardiovascular:  Regular rate and rhythm no S3 no murmurs  GI:  Abdomen soft nontender. No organomegaly.   Lymphatic:  No cervical or inguinal lymphadenopathy  Neurologic:  Patient is awake alert, oriented . Grossly nonfocal  Extremities no edema    EKG shows sinus rhythm and is within normal limits.    Patient had exercise stress test in January 2023.  No evidence of ischemia noted.  No EKG changes noted.    Discussion/Summary:    This patient is being considered for abdominal surgery.  Patient does have a history of gastric bypass in 2001 at [facility].    Patient has no history of CAD or MI in the past.    Clinical examination from cardiac " standpoint is unremarkable.    Patient has been very active with no symptoms with exertion.    EKG shows normal rhythm and within normal limits.    Stress test 2 years ago showed no evidence of ischemia.    No specific contraindication from cardiac standpoint to proceed with the planned surgery as low cardiac risk.  This has been discussed with the patient.    Symptoms to watch out from cardiac standpoint which would indicate the need for further cardiac evaluation discussed with patient.  Patient is agreeable with the plan of care.

## 2025-02-24 NOTE — LETTER
February 24, 2025     Misbah Peace MD  [address]  [address] [facility] [address]    Patient: Perri Cortes   YOB: 1960   Date of Visit: 2/24/2025       Dear Dr. Peace:    Thank you for referring Perri Cortes to me for evaluation. Below are my notes for this consultation.    If you have questions, please do not hesitate to call me. I look forward to following your patient along with you.         Sincerely,        Azul Mcmullen MD        CC: No Recipients    Azul Mcmullen MD  2/24/2025  3:32 PM  Incomplete                                             Cardiology Consultation     Perri Cortes  [id number]  1960  [address] [facility] [place]  [address] [facility] [address]  [facility] [address]    Patient presents for cardiovascular risk assessment for abdominal surgery.  Patient does have history of bariatric surgery many years ago in [place].    1. Pre-operative cardiovascular examination  Ambulatory referral to Cardiology        Patient Active Problem List   Diagnosis   • Allergic rhinitis due to pollen   • Anxiety   • Bariatric surgery status   • Depression   • Iron deficiency anemia   • Persistent cough     Past Medical History:   Diagnosis Date   • Acid reflux 11/2024   • ADHD    • Anemia 1990   • Anxiety 2001   • Anxiety and depression    • Depression 1990   • Eating disorder    • GERD (gastroesophageal reflux disease) 2024   • Hernia 2012    Surgery   • Iron malabsorption    • Obesity      Social History     Socioeconomic History   • Marital status: /Civil Union     Spouse name: Not on file   • Number of children: Not on file   • Years of education: Not on file   • Highest education level: Not on file   Occupational History   • Not on file   Tobacco Use   • Smoking status: Former     Current packs/day: 0.00     Average packs/day: 0.5 packs/day for 5.0 years (2.5 ttl pk-yrs)     Types: Cigarettes     Quit  date: 1985     Years since quittin.1   • Smokeless tobacco: Never   Vaping Use   • Vaping status: Never Used   Substance and Sexual Activity   • Alcohol use: Not Currently     Comment: Less than 12 a year   • Drug use: Not Currently   • Sexual activity: Not Currently     Partners: Male     Birth control/protection: None     Comment: No libido   Other Topics Concern   • Not on file   Social History Narrative   • Not on file     Social Drivers of Health     Financial Resource Strain: Not At Risk (2025)    Received from [facility]    Financial Insecurity    • In the last 12 months did you skip medications to save money?: No    • In the last 12 months was there a time when you needed to see a doctor but could not because of cost?: No   Food Insecurity: No Food Insecurity (2025)    Received from [facility]    Food Insecurity    • In the last 12 months did you ever eat less than you felt you should because there wasn't enough money for food?: No   Transportation Needs: No Transportation Needs (2025)    Received from [facility]    Transportation Needs    • In the last 12 months have you ever had to go without healthcare because you didn't have a way to get there?: No   Physical Activity: Not on file   Stress: Not on file   Social Connections: Socially Integrated (2025)    Received from [facility]    Social Connection    • Do you often feel lonely?: No   Intimate Partner Violence: Not on file   Housing Stability: Not At Risk (2025)    Received from [facility]    Housing Stability    • Are you worried that in the next 2 months you may not have stable housing?: No      Family History   Problem Relation Age of Onset   • Diabetes Mother    • Arthritis Mother    • Asthma Mother    • Hearing loss Mother    • Hypertension Mother    • Colon cancer Father    • Addiction problem Father    • Cancer Father    •  "Hypertension Father    • Stroke Father         Was in his 40’s   • Colon cancer Maternal Grandmother    • Depression Sister    • Hearing loss Sister    • Depression Sister      Past Surgical History:   Procedure Laterality Date   • APPENDECTOMY     • BARIATRIC SURGERY     • COLONOSCOPY     • ENDOMETRIAL ABLATION     • HERNIA REPAIR     • SLEEVE GASTROPLASTY  2001    [facility]       Current Outpatient Medications:   •  Citalopram Hydrobromide (CELEXA PO), Take 30 mg by mouth daily, Disp: , Rfl:   •  [facility] Vitamin B-12 1000 MCG tablet, Take 1,000 mcg by mouth daily, Disp: , Rfl:   •  Doxepin HCl 6 MG TABS, TAKE 1/2 TO 1 TABLET DAILY AT BEDTIME, Disp: , Rfl:   •  Iron, Ferrous Sulfate, 75 (15 Fe) MG/ML SOLN, , Disp: , Rfl:   •  lamoTRIgine (LaMICtal) 100 mg tablet, Take 100 mg by mouth 2 (two) times a day, Disp: , Rfl:   •  LORazepam (ATIVAN) 0.5 mg tablet, Take 0.5 mg by mouth 2 (two) times a day as needed, Disp: , Rfl:   •  omeprazole (PriLOSEC) 40 MG capsule, TAKE 1 CAPSULE (40 MG TOTAL) BY MOUTH 2 (TWO) TIMES A DAY 30-60 MIN BEFORE BREAKFAST AND 30-60 MINUTES BEFORE DINNER, OPEN CAPSULES AND TAKE WITH WATER IMMEDIATELY, Disp: 180 capsule, Rfl: 1  Allergies   Allergen Reactions   • Contrast [Iodinated Contrast Media] Rash   • Methocarbamol Hives   • Penicillins Abdominal Pain, Anxiety, Dizziness, Hallucinations, Headache, Hives, Itching, Rash and Swelling     Vitals:    02/24/25 1000   BP: 118/74   BP Location: Left arm   Patient Position: Sitting   Cuff Size: Standard   Pulse: 71   Resp: 16   SpO2: 97%   Weight: 63 kg (139 lb)   Height: 5' 1.5\" (1.562 m)       Labs:{Recent labs:11228}  Imaging: Mammo screening bilateral w 3d and cad  Result Date: 2/7/2025  Narrative: HISTORY: Patient is 65 years old and is seen for a screening mammogram of both breasts performed on 2/7/25. No relevant medical history has been documented for this patient. Surgical history includes breast biopsy. No relevant family history has been " "documented for this patient. No relevant hormone history has been documented for this patient. FILMS COMPARED: The present examination has been compared to prior imaging studies. MAMMOGRAM FINDINGS: A minimum of two views were obtained. 3D tomosynthesis was performed. Computer-aided detection was utilized by the radiologist in the interpretation of this examination. There are scattered areas of fibroglandular density. No suspicious masses, calcifications or other abnormalities are seen. Stable Left breast nodule Left breast biopsy clips    Impression: There is no mammographic evidence of malignancy. BI-RADS Category:  2 - Benign Return to Screening is recommended. Lifetime Tyrer-Cuzick 8: 3.81% In patients with a documented history of breast cancer, male patients, patients legal sex is unknown or with an age less than 19 or 85 and greater a risk score will not be calculated. Based on the information provided by the patient, risk scores for breast cancer for 5 years, 10 years and lifetime was calculated using both breast density and family history.  Patients should consult with their referring providers regarding the significance of the score, potential need for additional risk assessment and supplemental screening including whole breast ultrasound and MRI MRN: [id number]   Patient Name: Perri \"Chey\" Sebastian This exam was performed at: [facility] [address] [place] [address] [phone] Workstation: [id number]      Review of Systems:  Review of Systems    Physical Exam:  Physical Exam    Discussion/Summary:***    Azul Mcmullen MD  2/24/2025 10:08 AM  Sign when Signing Visit                                             Cardiology Consultation     Perri Cortes  [id number]  1960  [address] [facility] [place]  [address] [address]    1. Pre-operative cardiovascular examination "  Ambulatory referral to Cardiology        Patient Active Problem List   Diagnosis   • Allergic rhinitis due to pollen   • Anxiety   • Bariatric surgery status   • Depression   • Iron deficiency anemia   • Persistent cough     Past Medical History:   Diagnosis Date   • Acid reflux 2024   • ADHD    • Anemia    • Anxiety    • Anxiety and depression    • Depression    • Eating disorder    • GERD (gastroesophageal reflux disease)    • Hernia 2012    Surgery   • Iron malabsorption    • Obesity      Social History     Socioeconomic History   • Marital status: /Civil Union     Spouse name: Not on file   • Number of children: Not on file   • Years of education: Not on file   • Highest education level: Not on file   Occupational History   • Not on file   Tobacco Use   • Smoking status: Former     Current packs/day: 0.00     Average packs/day: 0.5 packs/day for 5.0 years (2.5 ttl pk-yrs)     Types: Cigarettes     Quit date: 1985     Years since quittin.1   • Smokeless tobacco: Never   Vaping Use   • Vaping status: Never Used   Substance and Sexual Activity   • Alcohol use: Not Currently     Comment: Less than 12 a year   • Drug use: Not Currently   • Sexual activity: Not Currently     Partners: Male     Birth control/protection: None     Comment: No libido   Other Topics Concern   • Not on file   Social History Narrative   • Not on file     Social Drivers of Health     Financial Resource Strain: Not At Risk (2025)    Received from [facility]    Financial Insecurity    • In the last 12 months did you skip medications to save money?: No    • In the last 12 months was there a time when you needed to see a doctor but could not because of cost?: No   Food Insecurity: No Food Insecurity (2025)    Received from [facility]    Food Insecurity    • In the last 12 months did you ever eat less than you felt you should because there wasn't enough money for  food?: No   Transportation Needs: No Transportation Needs (1/28/2025)    Received from [facility]    Transportation Needs    • In the last 12 months have you ever had to go without healthcare because you didn't have a way to get there?: No   Physical Activity: Not on file   Stress: Not on file   Social Connections: Socially Integrated (1/28/2025)    Received from [facility]    Social Connection    • Do you often feel lonely?: No   Intimate Partner Violence: Not on file   Housing Stability: Not At Risk (1/28/2025)    Received from [facility]    Housing Stability    • Are you worried that in the next 2 months you may not have stable housing?: No      Family History   Problem Relation Age of Onset   • Diabetes Mother    • Arthritis Mother    • Asthma Mother    • Hearing loss Mother    • Hypertension Mother    • Colon cancer Father    • Addiction problem Father    • Cancer Father    • Hypertension Father    • Stroke Father         Was in his 40’s   • Colon cancer Maternal Grandmother    • Depression Sister    • Hearing loss Sister    • Depression Sister      Past Surgical History:   Procedure Laterality Date   • APPENDECTOMY     • BARIATRIC SURGERY     • COLONOSCOPY     • ENDOMETRIAL ABLATION     • HERNIA REPAIR     • SLEEVE GASTROPLASTY  2001    [facility]       Current Outpatient Medications:   •  Citalopram Hydrobromide (CELEXA PO), Take 30 mg by mouth daily, Disp: , Rfl:   •  [facility] Vitamin B-12 1000 MCG tablet, Take 1,000 mcg by mouth daily, Disp: , Rfl:   •  Doxepin HCl 6 MG TABS, TAKE 1/2 TO 1 TABLET DAILY AT BEDTIME, Disp: , Rfl:   •  Iron, Ferrous Sulfate, 75 (15 Fe) MG/ML SOLN, , Disp: , Rfl:   •  lamoTRIgine (LaMICtal) 100 mg tablet, Take 100 mg by mouth 2 (two) times a day, Disp: , Rfl:   •  LORazepam (ATIVAN) 0.5 mg tablet, Take 0.5 mg by mouth 2 (two) times a day as needed, Disp: , Rfl:   •  omeprazole (PriLOSEC) 40 MG capsule, TAKE 1 CAPSULE (40 MG TOTAL) BY  "MOUTH 2 (TWO) TIMES A DAY 30-60 MIN BEFORE BREAKFAST AND 30-60 MINUTES BEFORE DINNER, OPEN CAPSULES AND TAKE WITH WATER IMMEDIATELY, Disp: 180 capsule, Rfl: 1  Allergies   Allergen Reactions   • Contrast [Iodinated Contrast Media] Rash   • Methocarbamol Hives   • Penicillins Abdominal Pain, Anxiety, Dizziness, Hallucinations, Headache, Hives, Itching, Rash and Swelling     Vitals:    02/24/25 1000   BP: 118/74   BP Location: Left arm   Patient Position: Sitting   Cuff Size: Standard   Pulse: 71   Resp: 16   SpO2: 97%   Weight: 63 kg (139 lb)   Height: 5' 1.5\" (1.562 m)       Labs:{Recent labs:19471}  Imaging: Mammo screening bilateral w 3d and cad  Result Date: 2/7/2025  Narrative: HISTORY: Patient is 65 years old and is seen for a screening mammogram of both breasts performed on 2/7/25. No relevant medical history has been documented for this patient. Surgical history includes breast biopsy. No relevant family history has been documented for this patient. No relevant hormone history has been documented for this patient. FILMS COMPARED: The present examination has been compared to prior imaging studies. MAMMOGRAM FINDINGS: A minimum of two views were obtained. 3D tomosynthesis was performed. Computer-aided detection was utilized by the radiologist in the interpretation of this examination. There are scattered areas of fibroglandular density. No suspicious masses, calcifications or other abnormalities are seen. Stable Left breast nodule Left breast biopsy clips    Impression: There is no mammographic evidence of malignancy. BI-RADS Category:  2 - Benign Return to Screening is recommended. Lifetime Tyrer-Cuzick 8: 3.81% In patients with a documented history of breast cancer, male patients, patients legal sex is unknown or with an age less than 19 or 85 and greater a risk score will not be calculated. Based on the information provided by the patient, risk scores for breast cancer for 5 years, 10 years and lifetime was " "calculated using both breast density and family history.  Patients should consult with their referring providers regarding the significance of the score, potential need for additional risk assessment and supplemental screening including whole breast ultrasound and MRI MRN: [id number]   Patient Name: Perri \"Dot\" Sebastian This exam was performed at: [facility] [address] [place] [id number] [phone] Workstation: [id number]      Review of Systems:  Review of Systems    Physical Exam:  Physical Exam    Discussion/Summary:***  "

## 2025-02-28 ENCOUNTER — OFFICE VISIT (OUTPATIENT)
Age: 65
End: 2025-02-28
Payer: COMMERCIAL

## 2025-02-28 VITALS
BODY MASS INDEX: 26.36 KG/M2 | OXYGEN SATURATION: 98 % | DIASTOLIC BLOOD PRESSURE: 72 MMHG | HEART RATE: 80 BPM | WEIGHT: 139.6 LBS | SYSTOLIC BLOOD PRESSURE: 116 MMHG | RESPIRATION RATE: 16 BRPM | HEIGHT: 61 IN | TEMPERATURE: 97.9 F

## 2025-02-28 DIAGNOSIS — F32.2 SEVERE MAJOR DEPRESSIVE DISORDER (HCC): ICD-10-CM

## 2025-02-28 DIAGNOSIS — R10.9 RIGHT LATERAL ABDOMINAL PAIN: Primary | ICD-10-CM

## 2025-02-28 DIAGNOSIS — E55.9 VITAMIN D INSUFFICIENCY: ICD-10-CM

## 2025-02-28 DIAGNOSIS — E78.5 DYSLIPIDEMIA: ICD-10-CM

## 2025-02-28 PROCEDURE — 99214 OFFICE O/P EST MOD 30 MIN: CPT | Performed by: FAMILY MEDICINE

## 2025-02-28 RX ORDER — CHOLECALCIFEROL (VITAMIN D3) 1250 MCG
50000 CAPSULE ORAL 2 TIMES WEEKLY
Qty: 8 CAPSULE | Refills: 0 | Status: SHIPPED | OUTPATIENT
Start: 2025-03-03

## 2025-02-28 NOTE — PROGRESS NOTES
[facility]  Ambulatory Office Visit     PATIENT INFORMATION   Name: Perri Cortes   YOB: 1960   MRN: [id number]  Encounter Provider: Babak Eduardo MD    Encounter Date: 2/28/2025    ASSESSMENT & PLAN     Assessment & Plan  Right lateral abdominal pain  Acute abdominal pain that comes on suddenly lasting several days.  CT completed-unable to see CT abdomen but able to see his CT renal which showed very small umbilical hernia filled with fat.  Adhesive pain versus pain secondary to hernia.  Patient has upcoming appointment with bariatric surgery team.  Advised going to the emergency room if severe pain returns.       Dyslipidemia  12/30/2024 Reviewed & discussed labs today.   Currently not prescribed any meds.   Reports no concerns.   Assessment: Apolipoprotein B 85, lipoprotein a less than 9.  Overall good  Med changes: None. Continue current regimen and working on lifestyle improvement.    Advised improving nutritional intake and exercising regularly, really focusing on building good habits as discussed.  Goal of <60 Apolipoprotein B & LDL, <100 triglycerides, and >60 HDL  Monitor lipid panel annually        Vitamin D insufficiency  Reviewed & Discussed labs  Deficient vitamin D levels  Goal , preventative therapy   Start vitamin D3 50,000 units two times a week, no further refills.   After weekly prescription dose, start vitamin D3 2,000-5,000 units daily supplements from over-the-counter   Follow-up with vitamin D level after completion of high-dose prescription.   Advised taking vitamin K2 100-200 mcg daily for preventative measures for vascular calcification.   Printed instructions provided.      Orders:  •  Vitamin D 25 hydroxy; Future  •  Cholecalciferol (Vitamin D3) 1.25 MG (54374 UT) CAPS; Take 1 capsule (50,000 Units total) by mouth 2 (two) times a week    Severe major depressive disorder (HCC)  Following psych team for med management.  Currently prescribed the  following:  Lamictal 100 mg twice a day  Celexa 30 mg daily  Doxepin 6 mg prn   Ativan 0.5 mg twice a day  Reports taking as prescribed.  Continue care with psych.  Advised on the importance of building and maintaining good coping mechanisms with recommendations as discussed including  Yoga, recommend 3-4x/week  Breath-work/Meditation  Journalling, consistently.   Exercising/walking consistently.  Getting good quality sleep               HEALTH MAINTENANCE     Immunization History   Administered Date(s) Administered   • COVID-19 PFIZER VACCINE 0.3 ML IM 04/15/2021, 05/11/2021   • INFLUENZA 11/04/2016, 11/03/2017, 11/26/2019, 12/16/2020, 10/24/2023, 10/24/2023   • Influenza, seasonal, injectable, preservative free 10/05/2012, 11/17/2014, 01/13/2016, 10/28/2021   • Tdap 10/05/2012, 09/11/2023   • Tuberculin Skin Test-PPD Intradermal 02/08/2015, 11/03/2017   • Zoster Vaccine Recombinant 05/05/2022, 09/23/2022     Pap smear:Not on file  Mammogram:02/07/2025   Colonoscopy:04/15/2024 04/15/2024  Cologuard:Not on file   DEXA scan:12/30/2024      FOLLOW UP   Return in about 6 months (around 8/28/2025) for routine follow up.    CURRENT MEDICATIONS     Current Outpatient Medications:   •  [START ON 3/3/2025] Cholecalciferol (Vitamin D3) 1.25 MG (47187 UT) CAPS, Take 1 capsule (50,000 Units total) by mouth 2 (two) times a week, Disp: 8 capsule, Rfl: 0  •  Citalopram Hydrobromide (CELEXA PO), Take 30 mg by mouth daily, Disp: , Rfl:   •  [facility] Vitamin B-12 1000 MCG tablet, Take 1,000 mcg by mouth daily, Disp: , Rfl:   •  Doxepin HCl 6 MG TABS, TAKE 1/2 TO 1 TABLET DAILY AT BEDTIME, Disp: , Rfl:   •  Iron, Ferrous Sulfate, 75 (15 Fe) MG/ML SOLN, , Disp: , Rfl:   •  lamoTRIgine (LaMICtal) 100 mg tablet, Take 100 mg by mouth 2 (two) times a day, Disp: , Rfl:   •  LORazepam (ATIVAN) 0.5 mg tablet, Take 0.5 mg by mouth 2 (two) times a day as needed, Disp: , Rfl:   •  omeprazole (PriLOSEC) 40 MG capsule, TAKE 1 CAPSULE (40 MG TOTAL) BY  "MOUTH 2 (TWO) TIMES A DAY 30-60 MIN BEFORE BREAKFAST AND 30-60 MINUTES BEFORE DINNER, OPEN CAPSULES AND TAKE WITH WATER IMMEDIATELY, Disp: 180 capsule, Rfl: 1      CHIEF COMPLIANT     Chief Complaint   Patient presents with   • Follow-up   • Physical Exam        HISTORY OF PRESENT ILLNESS    History of Present Illness     History obtained from : patient  HPI  Review of Systems    PAST MEDICAL & SURGICAL HISTORY     Past Medical History:   Diagnosis Date   • Acid reflux 11/2024   • ADHD    • Anemia 1990   • Anxiety 2001   • Anxiety and depression    • Depression 1990   • Eating disorder    • GERD (gastroesophageal reflux disease) 2024   • Hernia 2012    Surgery   • Iron malabsorption    • Obesity      Past Surgical History:   Procedure Laterality Date   • APPENDECTOMY     • BARIATRIC SURGERY     • COLONOSCOPY     • ENDOMETRIAL ABLATION     • HERNIA REPAIR     • SLEEVE GASTROPLASTY  2001    [facility]       OBJECTIVES      /72 (BP Location: Left arm, Patient Position: Sitting, Cuff Size: Standard)   Pulse 80   Temp 97.9 °F (36.6 °C) (Tympanic)   Resp 16   Ht 5' 1.2\" (1.554 m)   Wt 63.3 kg (139 lb 9.6 oz)   SpO2 98%   BMI 26.21 kg/m²    Physical Exam  Vitals reviewed.   Constitutional:       General: She is not in acute distress.     Appearance: Normal appearance. She is not ill-appearing, toxic-appearing or diaphoretic.   HENT:      Head: Normocephalic and atraumatic.      Right Ear: External ear normal.      Left Ear: External ear normal.      Nose: No congestion or rhinorrhea.   Eyes:      General: No scleral icterus.        Right eye: No discharge.         Left eye: No discharge.      Conjunctiva/sclera: Conjunctivae normal.   Cardiovascular:      Rate and Rhythm: Normal rate.   Pulmonary:      Effort: Pulmonary effort is normal. No respiratory distress.   Neurological:      General: No focal deficit present.      Mental Status: She is alert and oriented to person, place, and time.   Psychiatric:         Mood " and Affect: Mood normal.         Behavior: Behavior normal.         Thought Content: Thought content normal.           Babak Eduardo MD  Family Medicine Physician   [facility] [place]      Administrative Statements     Medications have been reviewed by provider in current encounter

## 2025-02-28 NOTE — ASSESSMENT & PLAN NOTE
12/30/2024 Reviewed & discussed labs today.   Currently not prescribed any meds.   Reports no concerns.   Assessment: Apolipoprotein B 85, lipoprotein a less than 9.  Overall good  Med changes: None. Continue current regimen and working on lifestyle improvement.    Advised improving nutritional intake and exercising regularly, really focusing on building good habits as discussed.  Goal of <60 Apolipoprotein B & LDL, <100 triglycerides, and >60 HDL  Monitor lipid panel annually

## 2025-02-28 NOTE — ASSESSMENT & PLAN NOTE
Acute abdominal pain that comes on suddenly lasting several days.  CT completed-unable to see CT abdomen but able to see his CT renal which showed very small umbilical hernia filled with fat.  Adhesive pain versus pain secondary to hernia.  Patient has upcoming appointment with bariatric surgery team.  Advised going to the emergency room if severe pain returns.

## 2025-02-28 NOTE — ASSESSMENT & PLAN NOTE
Following psych team for med management.  Currently prescribed the following:  Lamictal 100 mg twice a day  Celexa 30 mg daily  Doxepin 6 mg prn   Ativan 0.5 mg twice a day  Reports taking as prescribed.  Continue care with psych.  Advised on the importance of building and maintaining good coping mechanisms with recommendations as discussed including  Yoga, recommend 3-4x/week  Breath-work/Meditation  Journalling, consistently.   Exercising/walking consistently.  Getting good quality sleep

## 2025-03-04 ENCOUNTER — OFFICE VISIT (OUTPATIENT)
Dept: BARIATRICS | Facility: CLINIC | Age: 65
End: 2025-03-04
Payer: COMMERCIAL

## 2025-03-04 VITALS
DIASTOLIC BLOOD PRESSURE: 78 MMHG | HEIGHT: 61 IN | BODY MASS INDEX: 25.86 KG/M2 | HEART RATE: 87 BPM | WEIGHT: 137 LBS | SYSTOLIC BLOOD PRESSURE: 104 MMHG | OXYGEN SATURATION: 97 %

## 2025-03-04 DIAGNOSIS — R10.9 RIGHT LATERAL ABDOMINAL PAIN: Primary | ICD-10-CM

## 2025-03-04 PROCEDURE — 99213 OFFICE O/P EST LOW 20 MIN: CPT | Performed by: SURGERY

## 2025-03-04 NOTE — PROGRESS NOTES
"Progress Note - Bariatric Surgery   Perri Cortes 65 y.o. female MRN: [id number]  Unit/Bed#:  Encounter: [id number]    Assessment/Plan:    Right lateral abdominal pain  History of laparoscopic Flavio-en-Y gastric bypass in 2001 followed by open appendectomy in 2007.  Most recent bout of pain lasted several days and CT scan demonstrated a very small umbilical hernia with fat and dilated small bowel segment in the midline lower abdomen  Pain may be secondary to adhesive disease resulting in organ dysfunction versus internal hernia  We discussed if she has either of these conditions, and if they are able to be addressed during the time of surgery, she may still have the same symptoms after surgery  Cardiac risk stratification has been completed and she has no cardiac contraindications for diagnostic laparoscopy    Will plan to proceed with diagnostic laparoscopy with myself or Dr. Jemal Curtis at our earliest mutual convenience      Subjective/Objective     Subjective: She continues to complain of intermittent right-sided/right lower quadrant pain.  Interestingly, she has had similar pain since her open appendectomy.  Her weight loss is excellent from Flavio-en-Y gastric bypass.  She teaches water exercises at the gym.    Review of systems: Negative except as noted above    Objective:     Blood pressure 104/78, pulse 87, height 5' 1.2\" (1.554 m), weight 62.1 kg (137 lb), SpO2 97%.,Body mass index is 25.72 kg/m².      Invasive Devices       None                   Physical Exam:     No distress   EOMI   CN II-XII grossly intact  Neck ROM wnl   RRR  Breathing non labored   Abd flat, ND; wounds healed well  Skin warm/dry  Msk ROM wnl   Thought content/behavior/mood wnl               Lab, Imaging and other studies:I have personally reviewed pertinent lab and radiographic results.        "

## 2025-03-04 NOTE — ASSESSMENT & PLAN NOTE
History of laparoscopic Flavio-en-Y gastric bypass in 2001 followed by open appendectomy in 2007.  Most recent bout of pain lasted several days and CT scan demonstrated a very small umbilical hernia with fat and dilated small bowel segment in the midline lower abdomen  Pain may be secondary to adhesive disease resulting in organ dysfunction versus internal hernia  We discussed if she has either of these conditions, and if they are able to be addressed during the time of surgery, she may still have the same symptoms after surgery  Cardiac risk stratification has been completed and she has no cardiac contraindications for diagnostic laparoscopy    Will plan to proceed with diagnostic laparoscopy with myself or Dr. Jemal Curtis at our earliest mutual convenience

## 2025-03-05 ENCOUNTER — PREP FOR PROCEDURE (OUTPATIENT)
Dept: BARIATRICS | Facility: CLINIC | Age: 65
End: 2025-03-05

## 2025-03-05 DIAGNOSIS — Z98.84 BARIATRIC SURGERY STATUS: ICD-10-CM

## 2025-03-05 DIAGNOSIS — R10.13 ABDOMINAL PAIN, EPIGASTRIC: Primary | ICD-10-CM

## 2025-03-13 NOTE — PROGRESS NOTES
BARIATRIC HISTORY AND PHYSICAL - BARIATRIC SURGERY  Perri Cortes 65 y.o. female MRN: [id number]  Unit/Bed#:  Encounter: [id number]      [facility]  [address]  [place] [address]  [phone]        DATE OF SERVICE:  4/15/24     PHYSICIAN(S):  Attending:   Jonny Thakkar MD      Fellow:   No Staff Documented         INDICATION:  Decreased appetite, History of gastric bypass, Other iron deficiency anemia, Gastroesophageal reflux disease, unspecified whether esophagitis present     POST-OP DIAGNOSIS:  See the impression below.     PREPROCEDURE:  Informed consent was obtained for the procedure, including sedation.  Risks of perforation, hemorrhage, adverse drug reaction and aspiration were discussed. The patient was placed in the left lateral decubitus position.     Patient was explained about the risks and benefits of the procedure. Risks including but not limited to bleeding, infection, and perforation were explained in detail. Also explained about less than 100% sensitivity with the exam and other alternatives.     PROCEDURE: EGD     DETAILS OF PROCEDURE:   Patient was taken to the procedure room where a time out was performed to confirm correct patient and correct procedure. The patient underwent monitored anesthesia care, which was administered by an anesthesia professional. The patient's blood pressure, heart rate, level of consciousness, respirations, oxygen, ECG and ETCO2 were monitored throughout the procedure. The scope was introduced through the mouth and advanced to the jejunum. The patient's estimated blood loss was minimal (<5 mL). The procedure was not difficult. The patient tolerated the procedure well. There were no apparent adverse events.      ANESTHESIA INFORMATION:  ASA: I  Anesthesia Type: IV Sedation with Anesthesia     MEDICATIONS:  No administrations occurring from 1037 to 1046 on 04/15/24         FINDINGS:  The upper third of the esophagus, middle  third of the esophagus and lower third of the esophagus appeared normal.  Z-line 37 cm from the incisors  Mild, localized abnormal mucosa with erosion in the GE junction (37 cm from the incisors); performed cold forceps biopsy  The body of the stomach appeared normal. Small proximal remnant with normal anastomosis.  Performed forceps biopsies in the body of the stomach. Biopsy taken from the anastomosis.  The jejunum appeared normal.      History: Evaluate for internal hernia. History of previous Flavio-en-Y gastric   bypass surgery.      Exam: CT of the abdomen and pelvis without and with IV contrast.      Technique: Using helical technique, axial images were obtained through the   abdomen and pelvis prior to and following the administration of 100 cc of   Omnipaque 350 intravenous contrast. Coronal and sagittal reformations were   performed.      Comparison: Ultrasound abdomen complete 11/13/2023       Abdomen:   Lung Bases: 3 mm left lower lobe nodule (3:36).     Liver: Multiple cysts right and left lobes varying in size, to include 2.9 cm   lateral segment left lobe and 2 cm right lobe adjacent to the gallbladder,   similar in size to the prior ultrasound, on which they had features consistent   with cysts. Additional subcentimeter smaller lesions are present too small to   characterize.     Gallbladder/Bile ducts: Unremarkable     Spleen: Normal in size     Pancreas: Normal in size     Adrenal glands: Normal.     Kidneys/Ureters: Symmetric nephrograms. No hydronephrosis. 7 mm cyst left   posterior midpole     Bowel/Mesentery: Clips adjacent to the stomach. No evidence of free air or bowel   obstruction. Dilated small bowel segment midline lower abdomen. Oral contrast in   the colon.     Lymph nodes: No enlarged lymph nodes     Vessels: Normal in size     Abdominal Wall: Very small umbilical hernia with fat.      Pelvis:   Unremarkable     Urinary Bladder: Unremarkable     Lymph nodes:  No enlarged lymph nodes       Bones: No acute finding     HPI:  Perri Cortes is a 65 y.o. female who presents to review their preoperative workup and see if they are a good candidate to undergo a bariatric and/or foregut procedure.     Review of Systems   Constitutional:  Negative for chills and fever.   HENT:  Negative for ear pain and sore throat.    Eyes:  Negative for pain and visual disturbance.   Respiratory:  Negative for cough and shortness of breath.    Cardiovascular:  Negative for chest pain and palpitations.   Gastrointestinal:  Positive for abdominal pain. Negative for vomiting.   Genitourinary:  Negative for dysuria and hematuria.   Musculoskeletal:  Negative for arthralgias and back pain.   Skin:  Negative for color change and rash.   Neurological:  Negative for seizures and syncope.   All other systems reviewed and are negative.      Historical Information   Past Medical History:   Diagnosis Date    Acid reflux 2024    ADHD     Anemia     Anxiety     Anxiety and depression     Depression     Eating disorder     GERD (gastroesophageal reflux disease)     Hernia 2012    Surgery    Iron malabsorption     Obesity      Past Surgical History:   Procedure Laterality Date    APPENDECTOMY      BARIATRIC SURGERY      COLONOSCOPY      ENDOMETRIAL ABLATION      HERNIA REPAIR      SLEEVE GASTROPLASTY      RNY     Social History   Social History     Substance and Sexual Activity   Alcohol Use Not Currently    Comment: Less than 12 a year     Social History     Substance and Sexual Activity   Drug Use Not Currently     Social History     Tobacco Use   Smoking Status Former    Current packs/day: 0.00    Average packs/day: 0.5 packs/day for 5.0 years (2.5 ttl pk-yrs)    Types: Cigarettes    Quit date: 1985    Years since quittin.2   Smokeless Tobacco Never     Family History:   Family History   Problem Relation Age of Onset    Diabetes Mother     Arthritis Mother     Asthma Mother     Hearing loss Mother      Hypertension Mother     Colon cancer Father     Addiction problem Father     Cancer Father     Hypertension Father     Stroke Father         Was in his 40’s    Colon cancer Maternal Grandmother     Depression Sister     Hearing loss Sister     Depression Sister        Meds/Allergies   PTA meds:   Cannot display prior to admission medications because the patient has not been admitted in this contact.     Allergies   Allergen Reactions    Contrast [Iodinated Contrast Media] Rash    Methocarbamol Hives    Penicillins Abdominal Pain, Anxiety, Dizziness, Hallucinations, Headache, Hives, Itching, Rash and Swelling       Objective     Current Vitals:      bowel movement  [unfilled]    Invasive Devices       None                   Physical Exam  Vitals reviewed.   Constitutional:       General: She is not in acute distress.     Appearance: Normal appearance. She is not ill-appearing.   HENT:      Head: Normocephalic and atraumatic.      Nose: Nose normal.      Mouth/Throat:      Mouth: Mucous membranes are moist.      Pharynx: Oropharynx is clear.   Eyes:      Extraocular Movements: Extraocular movements intact.      Conjunctiva/sclera: Conjunctivae normal.   Cardiovascular:      Rate and Rhythm: Normal rate.   Pulmonary:      Effort: Pulmonary effort is normal. No respiratory distress.   Abdominal:      General: There is no distension.      Palpations: Abdomen is soft.      Tenderness: There is no abdominal tenderness. There is no guarding or rebound.      Comments: Well healed incisions from prior surgeries   Musculoskeletal:         General: No deformity. Normal range of motion.      Cervical back: Normal range of motion and neck supple.   Skin:     General: Skin is warm and dry.      Coloration: Skin is not jaundiced.   Neurological:      General: No focal deficit present.      Mental Status: She is alert and oriented to person, place, and time.   Psychiatric:         Mood and Affect: Mood normal.         Thought  Content: Thought content normal.         Lab Results: I have personally reviewed pertinent lab results.    Imaging: Results Review Statement: No pertinent imaging studies reviewed.  EKG, Pathology, and Other Studies: Results Review Statement: No pertinent imaging studies reviewed.    Code Status: [unfilled]  Advance Directive and Living Will:      Power of :    POLST:        Assessment/Plan:    The patient presented to review the preoperative workup and see if bariatric surgery is appropriate and indicated following the extensive preoperative workup and the enrollment in our weight loss program.  Preoperative workup was complete. Results were reviewed with the patient including the blood work results and the endoscopy findings and the biopsy results. We also reviewed the cardiology evaluation.    The patient was determined to be a good candidate for Diagnostic laparoscopy.  ----------------------------------------------------------------------  Smoking: no  Blood thinner use: no  Home pain medication use and who manages it: no  CPAP use: no (If yes, sleep study obtained and reminded pt to bring CPAP to hospital)  History of blood clots: no  Previous abdominal surgeries: open appendectomy, RNYGB, inguinal hernia  Cardiac clearance obtained: No specific contraindication from cardiac standpoint to proceed with the planned surgery as low cardiac risk.   EKG performed: Yes  EGD prior to surgery: Yes  Screening Labs obtained (CBC, CMP): yes  H. Pylori status: n/a  Medication allergies: Penicllin, contrast, methocarbemol  SLIM consult Post-Op: no  Reminded patient about 2 week pre-op liquid diet: n/a  10% body weight loss pre-operatively met/discussed: n/a  Consent signed: yes  Pre-Op ERAS Orders placed: yes  -----------------------------------------------------------------------  Risks and benefits explained one more time to the patient. Alternatives to surgery and alternative forms of surgery were also  explained. Post-surgical commitment and after care programs were explained.  Consent was signed. Questions were answered and concerns were addressed. Patient will need to start the 2 week liquid diet prior to surgery.  Patient wishes to proceed. As per [facility] guidelines, I had a discussion with the patient regarding their CODE STATUS in the perioperative period and the patient is level 1 or FULL CODE STATUS.    Veronica Benitez PA-C  Bariatric Surgery   3/13/2025  7:49 PM

## 2025-03-14 ENCOUNTER — CONSULT (OUTPATIENT)
Dept: BARIATRICS | Facility: CLINIC | Age: 65
End: 2025-03-14
Payer: COMMERCIAL

## 2025-03-14 ENCOUNTER — ANESTHESIA EVENT (OUTPATIENT)
Dept: PERIOP | Facility: HOSPITAL | Age: 65
End: 2025-03-14
Payer: COMMERCIAL

## 2025-03-14 VITALS
TEMPERATURE: 97.5 F | HEART RATE: 75 BPM | SYSTOLIC BLOOD PRESSURE: 116 MMHG | HEIGHT: 61 IN | WEIGHT: 142 LBS | BODY MASS INDEX: 26.81 KG/M2 | DIASTOLIC BLOOD PRESSURE: 74 MMHG

## 2025-03-14 DIAGNOSIS — R10.9 RIGHT LATERAL ABDOMINAL PAIN: Primary | ICD-10-CM

## 2025-03-14 PROCEDURE — 99213 OFFICE O/P EST LOW 20 MIN: CPT | Performed by: SURGERY

## 2025-03-14 RX ORDER — HEPARIN SODIUM 5000 [USP'U]/ML
5000 INJECTION, SOLUTION INTRAVENOUS; SUBCUTANEOUS ONCE
OUTPATIENT
Start: 2025-03-25 | End: 2025-03-14

## 2025-03-14 RX ORDER — CELECOXIB 200 MG/1
200 CAPSULE ORAL ONCE
OUTPATIENT
Start: 2025-03-25 | End: 2025-03-14

## 2025-03-14 RX ORDER — LEVOFLOXACIN 5 MG/ML
750 INJECTION, SOLUTION INTRAVENOUS ONCE
OUTPATIENT
Start: 2025-03-25 | End: 2025-03-14

## 2025-03-14 RX ORDER — ACETAMINOPHEN 10 MG/ML
1000 INJECTION, SOLUTION INTRAVENOUS ONCE
OUTPATIENT
Start: 2025-03-25 | End: 2025-03-14

## 2025-03-17 DIAGNOSIS — R10.9 RIGHT LATERAL ABDOMINAL PAIN: Primary | ICD-10-CM

## 2025-03-17 RX ORDER — OMEPRAZOLE 20 MG/1
20 CAPSULE, DELAYED RELEASE ORAL DAILY
Qty: 90 CAPSULE | Refills: 1 | Status: SHIPPED | OUTPATIENT
Start: 2025-03-17

## 2025-03-19 ENCOUNTER — OFFICE VISIT (OUTPATIENT)
Age: 65
End: 2025-03-19
Payer: COMMERCIAL

## 2025-03-19 VITALS
SYSTOLIC BLOOD PRESSURE: 114 MMHG | WEIGHT: 138 LBS | HEIGHT: 62 IN | DIASTOLIC BLOOD PRESSURE: 78 MMHG | BODY MASS INDEX: 25.4 KG/M2

## 2025-03-19 DIAGNOSIS — Z12.4 SCREENING FOR CERVICAL CANCER: ICD-10-CM

## 2025-03-19 DIAGNOSIS — Z01.419 ENCOUNTER FOR ANNUAL ROUTINE GYNECOLOGICAL EXAMINATION: ICD-10-CM

## 2025-03-19 DIAGNOSIS — Z12.31 ENCOUNTER FOR SCREENING MAMMOGRAM FOR MALIGNANT NEOPLASM OF BREAST: Primary | ICD-10-CM

## 2025-03-19 DIAGNOSIS — N95.2 VAGINAL ATROPHY: ICD-10-CM

## 2025-03-19 PROBLEM — M25.542 ARTHRALGIA OF BOTH HANDS: Status: ACTIVE | Noted: 2022-04-08

## 2025-03-19 PROBLEM — E61.1 HYPOFERREMIA: Status: ACTIVE | Noted: 2024-04-02

## 2025-03-19 PROBLEM — F41.1 ANXIETY STATE: Status: ACTIVE | Noted: 2021-04-12

## 2025-03-19 PROBLEM — M25.541 ARTHRALGIA OF BOTH HANDS: Status: ACTIVE | Noted: 2022-04-08

## 2025-03-19 PROBLEM — E66.811 CLASS 1 OBESITY IN ADULT: Status: ACTIVE | Noted: 2022-03-11

## 2025-03-19 PROBLEM — K21.9 GASTROESOPHAGEAL REFLUX DISEASE: Status: ACTIVE | Noted: 2024-04-22

## 2025-03-19 PROBLEM — D50.0 IRON DEFICIENCY ANEMIA DUE TO CHRONIC BLOOD LOSS: Status: ACTIVE | Noted: 2021-04-12

## 2025-03-19 PROBLEM — F32.5 MAJOR DEPRESSIVE DISORDER WITH SINGLE EPISODE, IN FULL REMISSION (HCC): Status: ACTIVE | Noted: 2022-03-07

## 2025-03-19 PROCEDURE — G0476 HPV COMBO ASSAY CA SCREEN: HCPCS

## 2025-03-19 PROCEDURE — S0610 ANNUAL GYNECOLOGICAL EXAMINA: HCPCS

## 2025-03-19 PROCEDURE — G0145 SCR C/V CYTO,THINLAYER,RESCR: HCPCS

## 2025-03-19 RX ORDER — ESTRADIOL 0.1 MG/G
0.5 CREAM VAGINAL DAILY
Qty: 42.5 G | Refills: 0 | Status: SHIPPED | OUTPATIENT
Start: 2025-03-19 | End: 2025-03-25

## 2025-03-19 NOTE — PROGRESS NOTES
Name: Perri Cortes      : 1960      MRN: [id number]  Encounter Provider: Viki Mccoy PA-C  Encounter Date: 3/19/2025   Encounter department: [facility] [place]  :  Assessment & Plan  Encounter for screening mammogram for malignant neoplasm of breast  Annual mammogram ordered and monthly breast self exam recommended.   We reviewed the signs and symptoms of breast cancer advised her to call if she experiences some  Orders:    Mammo screening bilateral w 3d and cad; Future    Screening for cervical cancer  Pap smear collected today  Orders:    Ambulatory referral to Obstetrics / Gynecology    Encounter for annual routine gynecological examination    Calcium 1200-1500mg (in divided doses-max 600 mg at one time) + 600-1000 IU Vit D daily.   Exercise 150-300 minutes per week minimum including weight bearing exercises.    Call your insurance company to verify coverage prior to completing any ordered tests.    Colonoscopy-          Return to office in one year or sooner, if needed.      Vaginal atrophy  Estrace cream  Pelvic floor PT  We discussed at home dilator therapy if she does not desire doing PT   Follow up with any  concerns or if symptoms do not improve despite tx  Orders:    Ambulatory Referral to Physical Therapy; Future    estradiol (ESTRACE VAGINAL) 0.1 mg/g vaginal cream; Insert 0.5 g into the vagina daily For 2 weeks, then 3x weekly.        History of Present Illness   HPI  Perri Cortes is a 65 y.o. female who presents for an annual exam      Menopausal,  No PMB  Vasomotor symptoms No  Sexually active No due to pain  HRT No  History of abnormal pap: Yes when she was younger has been normal in her adult life per pt  Last pap 22 , Findings Inadequate pap/ HPV negative  follow up pap 22 NILM Next pap: collected today  Mammogram 2025 bi-rads 2 LTC 3.81% Next Mammogram ordered  Colon Cancer screenin/15/2024 , type  colonoscopy , follow up 5 years   DEXA 12/30/2024  , Findings osteoporosis , Follow up 2026    Tried estrace cream for vaginal atrophy in the early 20-teens she does not recall it working well but is willing to try it again    Patient was an  in the past, now she teaches workout classes including water TABATA    Patient is getting an exploratory laparotomy 3/25/25 due to abdominal pain    Denies any breast, bladder or bowel complaints    Hereditary Cancer Screening  FH Breast/Ovarian cancer: none  FH Uterine cancer: none  FH Colon cancer: father and maternal grandmother  Cancer-related family history includes Cancer in her father; Colon cancer in her father and maternal grandmother. There is no history of Breast cancer.       Current Outpatient Medications on File Prior to Visit   Medication Sig Dispense Refill    Cholecalciferol (Vitamin D3) 1.25 MG (68543 UT) CAPS Take 1 capsule (50,000 Units total) by mouth 2 (two) times a week 8 capsule 0    Citalopram Hydrobromide (CELEXA PO) Take 30 mg by mouth daily      [facility] Vitamin B-12 1000 MCG tablet Take 1,000 mcg by mouth daily      Doxepin HCl 6 MG TABS TAKE 1/2 TO 1 TABLET DAILY AT BEDTIME      Iron, Ferrous Sulfate, 75 (15 Fe) MG/ML SOLN       lamoTRIgine (LaMICtal) 100 mg tablet Take 100 mg by mouth 2 (two) times a day      LORazepam (ATIVAN) 0.5 mg tablet Take 0.5 mg by mouth 2 (two) times a day as needed      Menaquinone-7 (VITAMIN K2 PO) Take by mouth      omeprazole (PriLOSEC) 40 MG capsule TAKE 1 CAPSULE (40 MG TOTAL) BY MOUTH 2 (TWO) TIMES A DAY 30-60 MIN BEFORE BREAKFAST AND 30-60 MINUTES BEFORE DINNER, OPEN CAPSULES AND TAKE WITH WATER IMMEDIATELY 180 capsule 1    omeprazole (PriLOSEC) 20 mg delayed release capsule Take 1 capsule (20 mg total) by mouth daily 90 capsule 1     No current facility-administered medications on file prior to visit.      Social History     Tobacco Use    Smoking status: Former     Current packs/day: 0.00     Average  "packs/day: 0.5 packs/day for 5.0 years (2.5 ttl pk-yrs)     Types: Cigarettes     Quit date: 1985     Years since quittin.2    Smokeless tobacco: Never   Vaping Use    Vaping status: Never Used   Substance and Sexual Activity    Alcohol use: Not Currently     Comment: Less than 12 a year    Drug use: Not Currently     Types: Marijuana    Sexual activity: Not Currently     Partners: Male     Birth control/protection: None     Comment: No libido        Objective   /78 (BP Location: Left arm, Patient Position: Sitting, Cuff Size: Standard)   Ht 5' 1.5\" (1.562 m)   Wt 62.6 kg (138 lb)   BMI 25.65 kg/m²      Physical Exam  Vitals and nursing note reviewed.   Constitutional:       General: She is not in acute distress.     Appearance: She is well-developed.   HENT:      Head: Normocephalic and atraumatic.   Eyes:      Extraocular Movements: Extraocular movements intact.   Pulmonary:      Effort: Pulmonary effort is normal.   Chest:   Breasts:     Right: No swelling, bleeding, inverted nipple, mass, nipple discharge, skin change or tenderness.      Left: No swelling, bleeding, inverted nipple, mass, nipple discharge, skin change or tenderness.   Abdominal:      Palpations: Abdomen is soft.      Tenderness: There is no abdominal tenderness.      Hernia: There is no hernia in the left inguinal area or right inguinal area.   Genitourinary:     General: Normal vulva.      Exam position: Lithotomy position.      Pubic Area: No rash.       Labia:         Right: No rash, tenderness or lesion.         Left: No rash, tenderness or lesion.       Urethra: No urethral pain or urethral swelling.      Vagina: No vaginal discharge, erythema, tenderness, bleeding or lesions.      Cervix: No cervical motion tenderness, discharge, friability, lesion, erythema or cervical bleeding.      Uterus: Not enlarged and not tender.       Adnexa:         Right: No mass, tenderness or fullness.          Left: No mass, tenderness or " fullness.        Comments: Vaginal atrophy noted  Lymphadenopathy:      Upper Body:      Right upper body: No supraclavicular, axillary or pectoral adenopathy.      Left upper body: No supraclavicular, axillary or pectoral adenopathy.      Lower Body: No right inguinal adenopathy. No left inguinal adenopathy.   Skin:     General: Skin is warm and dry.   Neurological:      Mental Status: She is alert.   Psychiatric:         Mood and Affect: Mood normal.         Behavior: Behavior normal.         Viki Mccoy PA-C

## 2025-03-20 LAB
HPV HR 12 DNA CVX QL NAA+PROBE: NEGATIVE
HPV16 DNA CVX QL NAA+PROBE: NEGATIVE
HPV18 DNA CVX QL NAA+PROBE: NEGATIVE

## 2025-03-21 DIAGNOSIS — E55.9 VITAMIN D INSUFFICIENCY: ICD-10-CM

## 2025-03-21 RX ORDER — CHOLECALCIFEROL (VITAMIN D3) 1250 MCG
CAPSULE ORAL
Qty: 24 CAPSULE | Refills: 1 | OUTPATIENT
Start: 2025-03-21

## 2025-03-24 ENCOUNTER — RESULTS FOLLOW-UP (OUTPATIENT)
Dept: OBGYN CLINIC | Facility: CLINIC | Age: 65
End: 2025-03-24

## 2025-03-24 LAB
LAB AP GYN PRIMARY INTERPRETATION: NORMAL
Lab: NORMAL

## 2025-03-24 RX ORDER — MAGNESIUM 30 MG
30 TABLET ORAL 2 TIMES DAILY
COMMUNITY

## 2025-03-24 NOTE — PRE-PROCEDURE INSTRUCTIONS
Pre-Surgery Instructions:   Medication Instructions    Calcium Carbonate (CALCIUM 500 PO) Hold day of surgery.    Cholecalciferol (Vitamin D3) 1.25 MG (43151 UT) CAPS Hold day of surgery.    Citalopram Hydrobromide (CELEXA PO) Take day of surgery.    [facility] Vitamin B-12 1000 MCG tablet Hold day of surgery.    Doxepin HCl 6 MG TABS PRN at hs    Iron, Ferrous Sulfate, 75 (15 Fe) MG/ML SOLN Hold day of surgery.    lamoTRIgine (LaMICtal) 100 mg tablet Take day of surgery.    LORazepam (ATIVAN) 0.5 mg tablet Uses PRN- OK to take day of surgery    magnesium 30 MG tablet Hold day of surgery.    Menaquinone-7 (VITAMIN K2 PO) Hold day of surgery.    omeprazole (PriLOSEC) 40 MG capsule Uses PRN- OK to take day of surgery   Medication instructions for day of surgery reviewed. Please take all instructed medications with only a sip of water.       You will receive a call one business day prior to surgery with an arrival time and hospital directions. If your surgery is scheduled on a Monday, the hospital will be calling you on the Friday prior to your surgery. If you have not heard from anyone by 8pm, please call the hospital supervisor through the hospital  at [phone]. ([place] [phone] or [place] [phone]).    Do not eat or drink anything after midnight the night before your surgery, including candy, mints, lifesavers, or chewing gum. Do not drink alcohol 24hrs before your surgery. Try not to smoke at least 24hrs before your surgery.       Follow the pre surgery showering instructions as listed in the “My Surgical Experience Booklet” or otherwise provided by your surgeon's office. Do not use a blade to shave the surgical area 1 week before surgery. It is okay to use a clean electric clippers up to 24 hours before surgery. Do not apply any lotions, creams, including makeup, cologne, deodorant, or perfumes after showering on the day of your surgery. Do not use dry shampoo, hair spray, hair gel, or any type of  hair products.     No contact lenses, eye make-up, or artificial eyelashes. Remove nail polish, including gel polish, and any artificial, gel, or acrylic nails if possible. Remove all jewelry including rings and body piercing jewelry.     Wear causal clothing that is easy to take on and off. Consider your type of surgery.    Keep any valuables, jewelry, piercings at home. Please bring any specially ordered equipment (sling, braces) if indicated.    Arrange for a responsible person to drive you to and from the hospital on the day of your surgery. Please confirm the visitor policy for the day of your procedure when you receive your phone call with an arrival time.     Call the surgeon's office with any new illnesses, exposures, or additional questions prior to surgery.    Please reference your “My Surgical Experience Booklet” for additional information to prepare for your upcoming surgery.

## 2025-03-25 ENCOUNTER — HOSPITAL ENCOUNTER (OUTPATIENT)
Facility: HOSPITAL | Age: 65
Setting detail: OUTPATIENT SURGERY
Discharge: HOME | End: 2025-03-25
Attending: SURGERY | Admitting: SURGERY
Payer: COMMERCIAL

## 2025-03-25 ENCOUNTER — ANESTHESIA (OUTPATIENT)
Dept: PERIOP | Facility: HOSPITAL | Age: 65
End: 2025-03-25
Payer: COMMERCIAL

## 2025-03-25 VITALS
OXYGEN SATURATION: 93 % | TEMPERATURE: 98.5 F | HEART RATE: 92 BPM | HEIGHT: 62 IN | RESPIRATION RATE: 16 BRPM | BODY MASS INDEX: 25.8 KG/M2 | DIASTOLIC BLOOD PRESSURE: 56 MMHG | SYSTOLIC BLOOD PRESSURE: 109 MMHG | WEIGHT: 140.21 LBS

## 2025-03-25 DIAGNOSIS — R10.9 RIGHT LATERAL ABDOMINAL PAIN: Primary | ICD-10-CM

## 2025-03-25 PROCEDURE — 44180 LAP ENTEROLYSIS: CPT | Performed by: SURGERY

## 2025-03-25 PROCEDURE — 44180 LAP ENTEROLYSIS: CPT | Performed by: STUDENT IN AN ORGANIZED HEALTH CARE EDUCATION/TRAINING PROGRAM

## 2025-03-25 RX ORDER — HEPARIN SODIUM 5000 [USP'U]/ML
5000 INJECTION, SOLUTION INTRAVENOUS; SUBCUTANEOUS ONCE
Status: COMPLETED | OUTPATIENT
Start: 2025-03-25 | End: 2025-03-25

## 2025-03-25 RX ORDER — DIPHENHYDRAMINE HCL 25 MG
25 TABLET ORAL EVERY 8 HOURS PRN
Status: CANCELLED | OUTPATIENT
Start: 2025-03-25

## 2025-03-25 RX ORDER — MAGNESIUM HYDROXIDE 1200 MG/15ML
LIQUID ORAL AS NEEDED
Status: DISCONTINUED | OUTPATIENT
Start: 2025-03-25 | End: 2025-03-25 | Stop reason: HOSPADM

## 2025-03-25 RX ORDER — FENTANYL CITRATE 50 UG/ML
INJECTION, SOLUTION INTRAMUSCULAR; INTRAVENOUS AS NEEDED
Status: DISCONTINUED | OUTPATIENT
Start: 2025-03-25 | End: 2025-03-25

## 2025-03-25 RX ORDER — FENTANYL CITRATE/PF 50 MCG/ML
25 SYRINGE (ML) INJECTION
Status: DISCONTINUED | OUTPATIENT
Start: 2025-03-25 | End: 2025-03-25 | Stop reason: HOSPADM

## 2025-03-25 RX ORDER — KETOROLAC TROMETHAMINE 30 MG/ML
15 INJECTION, SOLUTION INTRAMUSCULAR; INTRAVENOUS EVERY 6 HOURS SCHEDULED
Status: DISCONTINUED | OUTPATIENT
Start: 2025-03-25 | End: 2025-03-25 | Stop reason: HOSPADM

## 2025-03-25 RX ORDER — OXYCODONE HCL 5 MG/5 ML
5 SOLUTION, ORAL ORAL EVERY 4 HOURS PRN
Status: DISCONTINUED | OUTPATIENT
Start: 2025-03-25 | End: 2025-03-25 | Stop reason: HOSPADM

## 2025-03-25 RX ORDER — SIMETHICONE 80 MG
80 TABLET,CHEWABLE ORAL 4 TIMES DAILY PRN
Status: CANCELLED | OUTPATIENT
Start: 2025-03-25

## 2025-03-25 RX ORDER — LIDOCAINE HYDROCHLORIDE 20 MG/ML
INJECTION, SOLUTION EPIDURAL; INFILTRATION; INTRACAUDAL; PERINEURAL AS NEEDED
Status: DISCONTINUED | OUTPATIENT
Start: 2025-03-25 | End: 2025-03-25

## 2025-03-25 RX ORDER — OXYCODONE HCL 5 MG/5 ML
10 SOLUTION, ORAL ORAL EVERY 4 HOURS PRN
Status: DISCONTINUED | OUTPATIENT
Start: 2025-03-25 | End: 2025-03-25 | Stop reason: HOSPADM

## 2025-03-25 RX ORDER — MEPERIDINE HYDROCHLORIDE 25 MG/ML
INJECTION INTRAMUSCULAR; INTRAVENOUS; SUBCUTANEOUS AS NEEDED
Status: DISCONTINUED | OUTPATIENT
Start: 2025-03-25 | End: 2025-03-25

## 2025-03-25 RX ORDER — ACETAMINOPHEN 10 MG/ML
1000 INJECTION, SOLUTION INTRAVENOUS EVERY 6 HOURS SCHEDULED
Status: DISCONTINUED | OUTPATIENT
Start: 2025-03-25 | End: 2025-03-25 | Stop reason: HOSPADM

## 2025-03-25 RX ORDER — MIDAZOLAM HYDROCHLORIDE 2 MG/2ML
INJECTION, SOLUTION INTRAMUSCULAR; INTRAVENOUS AS NEEDED
Status: DISCONTINUED | OUTPATIENT
Start: 2025-03-25 | End: 2025-03-25

## 2025-03-25 RX ORDER — HYDROMORPHONE HCL/PF 1 MG/ML
0.5 SYRINGE (ML) INJECTION
Status: DISCONTINUED | OUTPATIENT
Start: 2025-03-25 | End: 2025-03-25 | Stop reason: HOSPADM

## 2025-03-25 RX ORDER — BUPIVACAINE HYDROCHLORIDE AND EPINEPHRINE 2.5; 5 MG/ML; UG/ML
INJECTION, SOLUTION EPIDURAL; INFILTRATION; INTRACAUDAL; PERINEURAL AS NEEDED
Status: DISCONTINUED | OUTPATIENT
Start: 2025-03-25 | End: 2025-03-25 | Stop reason: HOSPADM

## 2025-03-25 RX ORDER — ONDANSETRON 2 MG/ML
4 INJECTION INTRAMUSCULAR; INTRAVENOUS EVERY 6 HOURS PRN
Status: CANCELLED | OUTPATIENT
Start: 2025-03-25

## 2025-03-25 RX ORDER — EPHEDRINE SULFATE 50 MG/ML
INJECTION INTRAVENOUS AS NEEDED
Status: DISCONTINUED | OUTPATIENT
Start: 2025-03-25 | End: 2025-03-25

## 2025-03-25 RX ORDER — OXYCODONE HYDROCHLORIDE 5 MG/1
5 TABLET ORAL EVERY 4 HOURS PRN
Qty: 5 TABLET | Refills: 0 | Status: SHIPPED | OUTPATIENT
Start: 2025-03-25

## 2025-03-25 RX ORDER — SODIUM CHLORIDE, SODIUM LACTATE, POTASSIUM CHLORIDE, CALCIUM CHLORIDE 600; 310; 30; 20 MG/100ML; MG/100ML; MG/100ML; MG/100ML
125 INJECTION, SOLUTION INTRAVENOUS CONTINUOUS
Status: DISCONTINUED | OUTPATIENT
Start: 2025-03-25 | End: 2025-03-25 | Stop reason: HOSPADM

## 2025-03-25 RX ORDER — ONDANSETRON 2 MG/ML
INJECTION INTRAMUSCULAR; INTRAVENOUS AS NEEDED
Status: DISCONTINUED | OUTPATIENT
Start: 2025-03-25 | End: 2025-03-25

## 2025-03-25 RX ORDER — LEVOFLOXACIN 5 MG/ML
750 INJECTION, SOLUTION INTRAVENOUS ONCE
Status: COMPLETED | OUTPATIENT
Start: 2025-03-25 | End: 2025-03-25

## 2025-03-25 RX ORDER — FAMOTIDINE 10 MG/ML
20 INJECTION, SOLUTION INTRAVENOUS 2 TIMES DAILY
Status: DISCONTINUED | OUTPATIENT
Start: 2025-03-25 | End: 2025-03-25 | Stop reason: HOSPADM

## 2025-03-25 RX ORDER — MORPHINE SULFATE 4 MG/ML
4 INJECTION, SOLUTION INTRAMUSCULAR; INTRAVENOUS EVERY 4 HOURS PRN
Status: DISCONTINUED | OUTPATIENT
Start: 2025-03-25 | End: 2025-03-25 | Stop reason: HOSPADM

## 2025-03-25 RX ORDER — ALBUTEROL SULFATE 0.83 MG/ML
2.5 SOLUTION RESPIRATORY (INHALATION) ONCE AS NEEDED
Status: DISCONTINUED | OUTPATIENT
Start: 2025-03-25 | End: 2025-03-25 | Stop reason: HOSPADM

## 2025-03-25 RX ORDER — SODIUM CHLORIDE 9 MG/ML
INJECTION INTRAVENOUS AS NEEDED
Status: DISCONTINUED | OUTPATIENT
Start: 2025-03-25 | End: 2025-03-25 | Stop reason: HOSPADM

## 2025-03-25 RX ORDER — SODIUM CHLORIDE, SODIUM LACTATE, POTASSIUM CHLORIDE, CALCIUM CHLORIDE 600; 310; 30; 20 MG/100ML; MG/100ML; MG/100ML; MG/100ML
100 INJECTION, SOLUTION INTRAVENOUS CONTINUOUS
Status: CANCELLED | OUTPATIENT
Start: 2025-03-25

## 2025-03-25 RX ORDER — ROCURONIUM BROMIDE 10 MG/ML
INJECTION, SOLUTION INTRAVENOUS AS NEEDED
Status: DISCONTINUED | OUTPATIENT
Start: 2025-03-25 | End: 2025-03-25

## 2025-03-25 RX ORDER — LORAZEPAM 2 MG/ML
0.5 INJECTION INTRAMUSCULAR ONCE AS NEEDED
Status: DISCONTINUED | OUTPATIENT
Start: 2025-03-25 | End: 2025-03-25 | Stop reason: HOSPADM

## 2025-03-25 RX ORDER — PROPOFOL 10 MG/ML
INJECTION, EMULSION INTRAVENOUS AS NEEDED
Status: DISCONTINUED | OUTPATIENT
Start: 2025-03-25 | End: 2025-03-25

## 2025-03-25 RX ORDER — ACETAMINOPHEN 10 MG/ML
1000 INJECTION, SOLUTION INTRAVENOUS ONCE
Status: COMPLETED | OUTPATIENT
Start: 2025-03-25 | End: 2025-03-25

## 2025-03-25 RX ORDER — ONDANSETRON 2 MG/ML
4 INJECTION INTRAMUSCULAR; INTRAVENOUS ONCE AS NEEDED
Status: DISCONTINUED | OUTPATIENT
Start: 2025-03-25 | End: 2025-03-25 | Stop reason: HOSPADM

## 2025-03-25 RX ORDER — CELECOXIB 200 MG/1
200 CAPSULE ORAL ONCE
Status: COMPLETED | OUTPATIENT
Start: 2025-03-25 | End: 2025-03-25

## 2025-03-25 RX ORDER — PROMETHAZINE HYDROCHLORIDE 25 MG/ML
25 INJECTION, SOLUTION INTRAMUSCULAR; INTRAVENOUS EVERY 6 HOURS PRN
Status: DISCONTINUED | OUTPATIENT
Start: 2025-03-25 | End: 2025-03-25 | Stop reason: HOSPADM

## 2025-03-25 RX ORDER — DEXAMETHASONE SODIUM PHOSPHATE 10 MG/ML
INJECTION, SOLUTION INTRAMUSCULAR; INTRAVENOUS AS NEEDED
Status: DISCONTINUED | OUTPATIENT
Start: 2025-03-25 | End: 2025-03-25

## 2025-03-25 RX ADMIN — FENTANYL CITRATE 25 MCG: 50 INJECTION INTRAMUSCULAR; INTRAVENOUS at 17:10

## 2025-03-25 RX ADMIN — FENTANYL CITRATE 25 MCG: 50 INJECTION INTRAMUSCULAR; INTRAVENOUS at 17:05

## 2025-03-25 RX ADMIN — DEXAMETHASONE SODIUM PHOSPHATE 10 MG: 10 INJECTION INTRAMUSCULAR; INTRAVENOUS at 15:56

## 2025-03-25 RX ADMIN — FOSAPREPITANT DIMEGLUMINE 150 MG: 150 INJECTION, POWDER, LYOPHILIZED, FOR SOLUTION INTRAVENOUS at 12:34

## 2025-03-25 RX ADMIN — OXYCODONE HYDROCHLORIDE 5 MG: 5 SOLUTION ORAL at 19:48

## 2025-03-25 RX ADMIN — EPHEDRINE SULFATE 10 MG: 50 INJECTION INTRAVENOUS at 16:24

## 2025-03-25 RX ADMIN — SODIUM CHLORIDE, SODIUM LACTATE, POTASSIUM CHLORIDE, AND CALCIUM CHLORIDE 125 ML/HR: .6; .31; .03; .02 INJECTION, SOLUTION INTRAVENOUS at 13:22

## 2025-03-25 RX ADMIN — PROPOFOL 110 MG: 10 INJECTION, EMULSION INTRAVENOUS at 15:46

## 2025-03-25 RX ADMIN — ONDANSETRON 4 MG: 2 INJECTION INTRAMUSCULAR; INTRAVENOUS at 16:33

## 2025-03-25 RX ADMIN — SUGAMMADEX 200 MG: 100 INJECTION, SOLUTION INTRAVENOUS at 16:33

## 2025-03-25 RX ADMIN — ROCURONIUM 50 MG: 50 INJECTION, SOLUTION INTRAVENOUS at 15:46

## 2025-03-25 RX ADMIN — ACETAMINOPHEN 1000 MG: 10 INJECTION INTRAVENOUS at 16:10

## 2025-03-25 RX ADMIN — FENTANYL CITRATE 25 MCG: 50 INJECTION INTRAMUSCULAR; INTRAVENOUS at 17:00

## 2025-03-25 RX ADMIN — LEVOFLOXACIN: 5 INJECTION, SOLUTION INTRAVENOUS at 15:36

## 2025-03-25 RX ADMIN — CELECOXIB 200 MG: 200 CAPSULE ORAL at 12:35

## 2025-03-25 RX ADMIN — FENTANYL CITRATE 50 MCG: 50 INJECTION INTRAMUSCULAR; INTRAVENOUS at 16:17

## 2025-03-25 RX ADMIN — LIDOCAINE HYDROCHLORIDE 60 MG: 20 INJECTION, SOLUTION EPIDURAL; INFILTRATION; INTRACAUDAL at 15:46

## 2025-03-25 RX ADMIN — SODIUM CHLORIDE, SODIUM LACTATE, POTASSIUM CHLORIDE, AND CALCIUM CHLORIDE: .6; .31; .03; .02 INJECTION, SOLUTION INTRAVENOUS at 16:29

## 2025-03-25 RX ADMIN — FENTANYL CITRATE 50 MCG: 50 INJECTION INTRAMUSCULAR; INTRAVENOUS at 15:45

## 2025-03-25 RX ADMIN — MEPERIDINE HYDROCHLORIDE 12.5 MG: 25 INJECTION INTRAMUSCULAR; INTRAVENOUS; SUBCUTANEOUS at 16:52

## 2025-03-25 RX ADMIN — MIDAZOLAM HYDROCHLORIDE 2 MG: 1 INJECTION, SOLUTION INTRAMUSCULAR; INTRAVENOUS at 15:45

## 2025-03-25 RX ADMIN — HEPARIN SODIUM 5000 UNITS: 5000 INJECTION INTRAVENOUS; SUBCUTANEOUS at 12:34

## 2025-03-25 RX ADMIN — KETOROLAC TROMETHAMINE 15 MG: 30 INJECTION, SOLUTION INTRAMUSCULAR; INTRAVENOUS at 19:02

## 2025-03-25 NOTE — DISCHARGE INSTR - AVS FIRST PAGE
Bariatric/Weight Loss Surgery  Hospital Discharge Instructions  ACTIVITY:  Progress as feels comfortable - a good rule is:  if you are doing something and it begins to hurt, stop doing the activity. Walk every hour while at home.  You may walk stairs if you do so slowly  You may shower 48 hours after surgery.  Do not scrub incision sites.  Blot gently with clean towel to dry incisions. (see #4 below)   Use your incentive spirometer 10 times per hour while awake for 1 week after surgery.    DIET  You may advance to a regular diet as tolerated.    MEDICATIONS:  The abdominal nerve block will wear off during the first 1-2 days that you are home, and you may become sore (especially over incision site/sites where abdominal wall is sutured). This may create a pulling sensation, especially while moving around, and will fade over time.  Continue to take your Tylenol and your pain medication as instructed.     INCISION CARE  You may shower and get incisions wet 2 days after surgery. No soaking tub baths or swimming for 30 days after surgery. Keep abdominal area and incisions clean. Use soap and water to create a good lather and rinse off.  Do not scrub incisions.   If you have a drain, empty the drain as the nurses instructed.    FOLLOW-UP APPOINTMENT should be made for one week after discharge. Call surgeon’s office at [phone] to schedule an appointment.    CALL YOUR DOCTOR FOR:  pain not controlled by pain medications, a temperature greater than 101.5° F, any increase or change in drainage or redness from any incision, any vomiting or inability to keep liquids down, shortness of breath, shoulder pain, or bleeding

## 2025-03-25 NOTE — ANESTHESIA POSTPROCEDURE EVALUATION
Post-Op Assessment Note    CV Status:  Stable  Pain Score: 5    Pain management: inadequate       Mental Status:  Alert   Hydration Status:  Stable   PONV Controlled:  None   Airway Patency:  Patent  Airway: intubated     Post Op Vitals Reviewed: Yes    No anethesia notable event occurred.    Staff: CRNA       Last Filed PACU Vitals:  Vitals Value Taken Time   Temp     Pulse 122 03/25/25 1652   /66 03/25/25 1650   Resp     SpO2 85 % 03/25/25 1652   Vitals shown include unfiled device data.

## 2025-03-25 NOTE — ANESTHESIA POSTPROCEDURE EVALUATION
Post-Op Assessment Note    Last Filed PACU Vitals:  Vitals Value Taken Time   Temp 98.7 °F (37.1 °C) 03/25/25 1752   Pulse 98 03/25/25 1811   /57 03/25/25 1805   Resp 18 03/25/25 1811   SpO2 92 % 03/25/25 1811   Vitals shown include unfiled device data.    Modified Mark:     Vitals Value Taken Time   Activity 2 03/25/25 1652   Respiration 2 03/25/25 1652   Circulation 2 03/25/25 1652   Consciousness 2 03/25/25 1652   Oxygen Saturation 1 03/25/25 1652     Modified Mark Score: 9

## 2025-03-25 NOTE — OP NOTE
OPERATIVE REPORT  PATIENT NAME: Perri Cortes    :  1960  MRN: [id number]  Pt Location: [facility] OR ROOM 08    SURGERY DATE: 3/25/2025    Surgeons and Role:     * Ramu Curtis MD - Primary     * Misbah Peace MD    Preop Diagnosis:  Abdominal pain, epigastric [R10.13]  Bariatric surgery status [Z98.84]    Post-Op Diagnosis Codes:     * Abdominal pain, epigastric [R10.13]     * Bariatric surgery status [Z98.84]    Procedure(s):  LAPAROSCOPY DIAGNOSTIC. LYSIS OF ADHESIONS    Specimen(s):  * No specimens in log *    Estimated Blood Loss:   Minimal    Drains:  * No LDAs found *    Anesthesia Type:   General    Operative Indications:  Abdominal pain, epigastric [R10.13]  Bariatric surgery status [Z98.84]      Operative Findings:  Multiple interloop adhesions  Omental adhesions to abdominal wall mesh      Complications:   None    Procedure and Technique:  The patient was brought to the operating room and placed supine on the table. Following the uneventful induction of anesthesia, the abdomen was prepped and draped in normal sterile fashion. A timeout was performed according to institutional protocol, and all were in agreement that it was safe to proceed.     We began by inserting a Veress needle at Capellan's point and insufflating the abdomen to a pressure of 15 mmHg.  An optical entry trocar was inserted just to the left of the patient's umbilicus, and entry to the abdominal cavity was gained under direct visualization.  Following this bilateral tap blocks were performed using Exparel.  We placed four 5 mm trocars, 2 in the left mid abdomen, and 2 in the right.  We identified the gastric pouch and ran the Flavio limb proximally to the jejunojejunostomy.  We identified the biliopancreatic limb, which was also run to the jejunojejunostomy.  The common limb was run to the terminal ileum.  The mesenteric defect at the JJ, as well as Petersons defect were obliterated.  We did note multiple interloop adhesions  which did not appear to be causing any obstruction.  She was additionally noted to have a large intra-abdominal mesh, to which there was some omental adhesions.    We did not identify and the pathology to explain the patient's symptoms.  All ports were removed under direct visualization, and the skin at all trocar sites was reapproximated using 4-0 Monocryl in a simple buried fashion.  Skin glue was applied to all incisions after they were closed.    The patient was brought out of anesthesia and transferred to the PACU in stable condition.  Counts reported to be correct x 2 at the conclusion of the procedure.         I was present for the entire procedure.    Patient Disposition:  PACU     This procedure was not performed to treat colon cancer through resection           SIGNATURE: Misbah Peace MD  DATE: March 25, 2025  TIME: 4:52 PM

## 2025-03-25 NOTE — ANESTHESIA PREPROCEDURE EVALUATION
Procedure:  LAPAROSCOPY DIAGNOSTIC (Abdomen)    Relevant Problems   CARDIO   (+) Migraine headache      GI/HEPATIC   (+) Gastroesophageal reflux disease   (+) H/O bariatric surgery      HEMATOLOGY   (+) Iron deficiency anemia due to chronic blood loss      NEURO/PSYCH   (+) Anxiety state   (+) Depression   (+) Major depressive disorder with single episode, in full remission (HCC)   (+) Migraine headache   (+) Severe major depressive disorder (HCC)      Other   (+) Dyslipidemia        Physical Exam    Airway    Mallampati score: III  TM Distance: >3 FB  Neck ROM: full     Dental   No notable dental hx     Cardiovascular  Rhythm: regular, Rate: normal, Cardiovascular exam normal    Pulmonary  Pulmonary exam normal Breath sounds clear to auscultation    Other Findings  post-pubertal.      Anesthesia Plan  ASA Score- 3     Anesthesia Type- general with ASA Monitors.         Additional Monitors:     Airway Plan: ETT.           Plan Factors-    Chart reviewed.   Existing labs reviewed. Patient summary reviewed.                  Induction- intravenous.    Postoperative Plan- Plan for postoperative opioid use.         Informed Consent- Anesthetic plan and risks discussed with patient.        NPO Status:  Vitals Value Taken Time   Date of last liquid 03/25/25 03/25/25 1225   Time of last liquid 1226 03/25/25 1225   Date of last solid 03/24/25 03/25/25 1225   Time of last solid 2300 03/25/25 1225

## 2025-03-26 ENCOUNTER — TELEPHONE (OUTPATIENT)
Age: 65
End: 2025-03-26

## 2025-03-26 NOTE — TELEPHONE ENCOUNTER
"FOLLOW UP: [phone]    REASON FOR CONVERSATION: Advice Only    SYMPTOMS: PT is post-op day 1 Procedure: LAPAROSCOPY DIAGNOSTIC, LYSIS OF ADHESIONS (Abdomen) with Dr. Jemal Curtis. PT is extremely anxious and frustrated that Dr. Jemal Curtis did not return to PT while in recovery to discuss what was found during the surgery.     Advised PT that what was found during the surgery will be discussed during post-op appointment on 4/3/25. PT does not want to wait that long to find out what was found stating, \"Dr. Peace needs to go back to medical school because he dropped the ball and that's why I needed this surgery. I had to call 75 times before I was able to get help\"     OTHER: Advised PT that Dr. Jemal Curtis is not in the office today, but I can request that she receive a call back from either him or fellow at their earliest convenience. PT replied, \"given Dr. Jemal Curtis's arrogance, I'll be waiting 4 days.\"     DISPOSITION: Discuss with Provider and Call Back Patient    "

## 2025-03-26 NOTE — INCIDENTAL FINDINGS
I called the patient personally to review intra-op findings in details upon her request. I answered all of her questions and reviewed the findings and answered all of her questions. Looks like Dr. Misbah Remy conveyed the intra-op findings to her  but the patient was looking for additional details which I provided at length. Patient was satisfied with the answers and will see me again next week for her first postop.

## 2025-04-03 ENCOUNTER — OFFICE VISIT (OUTPATIENT)
Dept: BARIATRICS | Facility: CLINIC | Age: 65
End: 2025-04-03

## 2025-04-03 ENCOUNTER — TELEPHONE (OUTPATIENT)
Dept: BARIATRICS | Facility: CLINIC | Age: 65
End: 2025-04-03

## 2025-04-03 VITALS
TEMPERATURE: 97.6 F | HEIGHT: 61 IN | HEART RATE: 83 BPM | WEIGHT: 136.5 LBS | DIASTOLIC BLOOD PRESSURE: 62 MMHG | BODY MASS INDEX: 25.77 KG/M2 | SYSTOLIC BLOOD PRESSURE: 112 MMHG | OXYGEN SATURATION: 98 %

## 2025-04-03 DIAGNOSIS — Z09 POSTOP CHECK: Primary | ICD-10-CM

## 2025-04-03 PROCEDURE — 99024 POSTOP FOLLOW-UP VISIT: CPT | Performed by: SURGERY

## 2025-04-03 NOTE — PROGRESS NOTES
Date of surgery: 3/25/2025   Procedure: Diagnostic Lap   Performing surgeon: Dr. RAMA Curtis     Initial Weight - 139.0 lb   Current Weight - 136.5 lb   Yunior Weight - 136.5 lb   Total Body Weight Loss (EWL)-  2.4  EWL% - 38%  TWB % -  2%

## 2025-04-03 NOTE — TELEPHONE ENCOUNTER
LVM that incorrectly scheduled with Medical RD at 11 am, changed appointment to 4/9 at 1 pm with Melba the Surgical RD; informed if not convenient to call back and move appointment with Melba.

## 2025-04-03 NOTE — PROGRESS NOTES
POST OP UP VISIT - BARIATRIC SURGERY  Perri Cortes 65 y.o. female MRN: [id number]  Unit/Bed#:  Encounter: [id number]      HPI:  65 y.o. female status post diag lap and SADIE on 3/25 by Dr. Jemal Curtis, doing well post op. No major issues and healing well.     Tolerating diet.  Denies nausea and vomiting.  Taking multivitamins and PPI daily.    Review of Systems   Constitutional:  Negative for chills and fever.   HENT:  Negative for sore throat.    Eyes:  Negative for visual disturbance.   Respiratory:  Negative for cough and shortness of breath.    Gastrointestinal:  Negative for abdominal pain and vomiting.   Musculoskeletal:  Negative for arthralgias.   Skin:  Negative for color change and rash.   Neurological:  Negative for seizures and syncope.   All other systems reviewed and are negative.      Historical Information   Past Medical History:   Diagnosis Date    Abnormal Pap smear of cervix 1980    Acid reflux 11/2024    ADHD     Anemia 1990    Anxiety 2001    Anxiety and depression     Depression 1990    Eating disorder     Genital warts 1990    GERD (gastroesophageal reflux disease) 2024    Hernia 2012    Surgery    Herpes 1990    History of transfusion 2024    Low ferritin    Iron malabsorption     Migraine 1990    Miscarriage 2000    Obesity      Past Surgical History:   Procedure Laterality Date    APPENDECTOMY      BARIATRIC SURGERY      COLONOSCOPY      ENDOMETRIAL ABLATION      GYNECOLOGIC CRYOSURGERY  2000    HERNIA REPAIR      [place] LAPS ABD PRTM&OMENTUM DX W/WO SPEC BR/WA SPX N/A 3/25/2025    Procedure: LAPAROSCOPY DIAGNOSTIC, LYSIS OF ADHESIONS;  Surgeon: Ramu Curtis MD;  Location: [facility];  Service: Bariatrics    SLEEVE GASTROPLASTY  2001    RNY     Social History   Social History     Substance and Sexual Activity   Alcohol Use Not Currently    Comment: Less than 12 a year     Social History     Substance and Sexual Activity   Drug Use Not Currently    Types: Marijuana     Social History  "    Tobacco Use   Smoking Status Former    Current packs/day: 0.00    Average packs/day: 0.5 packs/day for 5.0 years (2.5 ttl pk-yrs)    Types: Cigarettes    Quit date: 1985    Years since quittin.2   Smokeless Tobacco Never     Family History: Family history non-contributory    Meds/Allergies   all medications and allergies reviewed  Allergies   Allergen Reactions    Contrast [Iodinated Contrast Media] Rash    Methocarbamol Hives    Penicillins Abdominal Pain, Anxiety, Dizziness, Hallucinations, Headache, Hives, Itching, Rash and Swelling       Objective       Current Vitals:   Blood Pressure: 112/62 (25 1458)  Pulse: 83 (25 1458)  Temperature: 97.6 °F (36.4 °C) (25 145)  Temp Source: Tympanic (25 145)  Height: 5' 1.2\" (155.4 cm) (25 1458)  Weight - Scale: 61.9 kg (136 lb 8 oz) (25 145)  SpO2: 98 % (25 145)      Invasive Devices       None                   Physical Exam  Vitals reviewed.   Constitutional:       Appearance: Normal appearance.   HENT:      Head: Atraumatic.      Nose: Nose normal.   Cardiovascular:      Pulses: Normal pulses.   Pulmonary:      Effort: Pulmonary effort is normal.   Abdominal:      Comments: Periumbilical port site with some mild erythema. Rest of lap sites CDI   Musculoskeletal:         General: Normal range of motion.   Neurological:      General: No focal deficit present.   Psychiatric:         Behavior: Behavior normal.             Assessment/PLAN:    65 y.o. female status post diag lap and SADIE on 3/25 by Dr. Jemal Curtis, doing well post op. No major issues and healing well.     Increase physical activity slowly as tolerated and instructed.  Advance diet as instructed by our dietitians today and as indicated in the binder.    Baci and gauze provided for periumbilical lap site.  Follow up in 6 months with [facility]  Dietician f/u for diet and vit recs.        Lupillo Ayon MD  Bariatric Surgery   4/3/2025  3:15 PM      .   "

## 2025-05-07 ENCOUNTER — CLINICAL SUPPORT (OUTPATIENT)
Dept: BARIATRICS | Facility: CLINIC | Age: 65
End: 2025-05-07

## 2025-05-07 VITALS — HEIGHT: 61 IN | WEIGHT: 136.8 LBS | BODY MASS INDEX: 25.83 KG/M2

## 2025-05-07 DIAGNOSIS — K91.2 POSTSURGICAL MALABSORPTION: Primary | ICD-10-CM

## 2025-05-07 PROCEDURE — RECHECK

## 2025-05-07 NOTE — PROGRESS NOTES
"Bariatric Assessment & Education Nutrition Note    Transfer patient   Patient has history of RNY gastric bypass in 2001 at [facility].   post diag lap and SADIE on 3/25 by Dr. Jemal Cortes  65 y.o.  female  There were no vitals taken for this visit.    Height: 5'1.2\"    Current Weight: 136.8#   BMI: 25.7  Weight Prior to Weight Loss Surgery: 250  DASHAWN:Current   Regain: Had fluctuations but wt now more stable      [place]- St. Jeor Equation:    BMR= 1110  Estimated calories for weight maintenance:  1332  ( sedentary  ) 1525 (low active)  Estimated calories for weight loss N/A   Estimated protein needs 62-93(1.0-1.5 gms/kg IBW )   Estimated fluids: 79 oz total   Fluid Requirement Calculator  Estimated free fluid 63  oz free fluid (Tamar-Segar method-20%)     Review of History and Medications   Past Medical History:   Diagnosis Date    Abnormal Pap smear of cervix 1980    Acid reflux 11/2024    ADHD     Anemia 1990    Anxiety 2001    Anxiety and depression     Depression 1990    Eating disorder     Genital warts 1990    GERD (gastroesophageal reflux disease) 2024    Hernia 2012    Surgery    Herpes 1990    History of transfusion 2024    Low ferritin    Iron malabsorption     Migraine 1990    Miscarriage 2000    Obesity      Past Surgical History:   Procedure Laterality Date    APPENDECTOMY      BARIATRIC SURGERY      COLONOSCOPY      ENDOMETRIAL ABLATION      GYNECOLOGIC CRYOSURGERY  2000    HERNIA REPAIR      [place] LAPS ABD PRTM&OMENTUM DX W/WO SPEC BR/WA SPX N/A 3/25/2025    Procedure: LAPAROSCOPY DIAGNOSTIC, LYSIS OF ADHESIONS;  Surgeon: Ramu Curtis MD;  Location: [facility] OR;  Service: Bariatrics    SLEEVE GASTROPLASTY  2001    RNY     Social History     Socioeconomic History    Marital status: /Civil Union     Spouse name: Not on file    Number of children: Not on file    Years of education: Not on file    Highest education level: Not on file   Occupational History    Not on file "   Tobacco Use    Smoking status: Former     Current packs/day: 0.00     Average packs/day: 0.5 packs/day for 5.0 years (2.5 ttl pk-yrs)     Types: Cigarettes     Quit date: 1985     Years since quittin.3    Smokeless tobacco: Never   Vaping Use    Vaping status: Never Used   Substance and Sexual Activity    Alcohol use: Not Currently     Comment: Less than 12 a year    Drug use: Not Currently     Types: Marijuana    Sexual activity: Not Currently     Partners: Male     Birth control/protection: None     Comment: No libido   Other Topics Concern    Not on file   Social History Narrative    Not on file     Social Drivers of Health     Financial Resource Strain: Not At Risk (2025)    Received from [facility]    Financial Insecurity     In the last 12 months did you skip medications to save money?: No     In the last 12 months was there a time when you needed to see a doctor but could not because of cost?: No   Food Insecurity: No Food Insecurity (2025)    Received from [facility]    Food Insecurity     In the last 12 months did you ever eat less than you felt you should because there wasn't enough money for food?: No   Transportation Needs: No Transportation Needs (2025)    Received from [facility]    Transportation Needs     In the last 12 months have you ever had to go without healthcare because you didn't have a way to get there?: No   Physical Activity: Sufficiently Active (2025)    Exercise Vital Sign     Days of Exercise per Week: 4 days     Minutes of Exercise per Session: 60 min   Stress: Not on file   Social Connections: Socially Integrated (2025)    Received from [facility]    Social Connection     Do you often feel lonely?: No   Intimate Partner Violence: Not on file   Housing Stability: Not At Risk (2025)    Received from [facility]    Housing Stability     Are you worried that in  the next 2 months you may not have stable housing?: No       Current Outpatient Medications:     Calcium Carbonate (CALCIUM 500 PO), Take by mouth, Disp: , Rfl:     Cholecalciferol (Vitamin D3) 1.25 MG (63494 UT) CAPS, Take 1 capsule (50,000 Units total) by mouth 2 (two) times a week, Disp: 8 capsule, Rfl: 0    Citalopram Hydrobromide (CELEXA PO), Take 30 mg by mouth daily, Disp: , Rfl:     [employer] Vitamin B-12 1000 MCG tablet, Take 1,000 mcg by mouth daily, Disp: , Rfl:     Doxepin HCl 6 MG TABS, prn, Disp: , Rfl:     Iron, Ferrous Sulfate, 75 (15 Fe) MG/ML SOLN, , Disp: , Rfl:     lamoTRIgine (LaMICtal) 100 mg tablet, Take 100 mg by mouth 2 (two) times a day, Disp: , Rfl:     LORazepam (ATIVAN) 0.5 mg tablet, Take 0.5 mg by mouth 2 (two) times a day as needed, Disp: , Rfl:     magnesium 30 MG tablet, Take 30 mg by mouth 2 (two) times a day, Disp: , Rfl:     Menaquinone-7 (VITAMIN K2 PO), Take by mouth, Disp: , Rfl:     omeprazole (PriLOSEC) 20 mg delayed release capsule, Take 1 capsule (20 mg total) by mouth daily, Disp: 90 capsule, Rfl: 1    omeprazole (PriLOSEC) 40 MG capsule, TAKE 1 CAPSULE (40 MG TOTAL) BY MOUTH 2 (TWO) TIMES A DAY 30-60 MIN BEFORE BREAKFAST AND 30-60 MINUTES BEFORE DINNER, OPEN CAPSULES AND TAKE WITH WATER IMMEDIATELY, Disp: 180 capsule, Rfl: 1    oxyCODONE (ROXICODONE) 5 immediate release tablet, Take 1 tablet (5 mg total) by mouth every 4 (four) hours as needed for severe pain for up to 5 doses Max Daily Amount: 30 mg (Patient not taking: Reported on 4/3/2025), Disp: 5 tablet, Rfl: 0    Food Intake and Lifestyle Assessment   Food Intake Assessment completed via usual diet recall -Takes Omeprazole   Breakfast/lunch: Coffee - 3 cups  Whole grain toiast - butter - CC or turkey burger, Schmidt & eggs  - Yogurt with fruit   Protein bars PB & J SW  Dinner: Frozen meal or cooks  Snack: - Cheese Pretzels,   Beverage intake:  40-48 oz  oz water and [place] Gingseng Decaf Tea  Diet texture/stage:  "regular  Protein supplement: Griffin   Estimated protein intake per day: 60-70  Estimated fluid intake per day: Adequate - at least 64 oz   Meals eaten away from home: Pizza or out for breakfast omlette - rye toast   Typical meal pattern: 2-3 meals per day and 1-2 snacks per day  Eating Behaviors: Appropriate diet advancement, Appropriate portion sizes, and Craves salty foods  Food addiction - transfer addictions - now has better relationship with food  Food allergies or intolerances: Milk ? 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       Patient educated and handouts provided.  Capacity of post-surgery stomach  Adequate hydration  Sugar and fat restriction to decrease \"dumping syndrome\"  Weight loss plateaus/ possibility of weight regain  Exercise  Nutrition considerations after surgery  Protein supplements  Meal planning and preparation  Appropriate carbohydrate, protein, and fat intake, and food/fluid choices to maximize safe weight loss, nutrient intake, and tolerance   Dietary and lifestyle " changes  Possible problems with poor eating habits  Intuitive eating  Techniques for self monitoring and keeping daily food journal  Vitamin / Mineral supplementation of Multivitamin with minerals, Calcium, Vitamin B12, Iron, Fat Soluble vitamins, and Vitamin D    Patient is not currently pregnant and doesn't desire to become pregnant a minimum of one year post-op    Education provided to: patient    Barriers to learning: No barriers identified    Readiness to change: action    Comprehension: verbalizes understanding     Expected Compliance: good    Evaluation/Monitoring   Eating pattern as discussed Tolerance of nutrition prescription Body weight Lab values Physical activity Bowel pattern    Goals  Keep to post op guidelines  Maintain protein (70-80 grams)  Maintain hydration ( 64 oz )  Avoid grazing  Volume at 1.25 c per meals - Macro goals as discussed above   Track with  Baritastic  Support groups and [employer] platforms encouraged   Keep to vitamin regimen as advised ( Marlo Multi with or with out iron - can use liquid iron and 1500 mg Calcium Citrate)   Treat vitamin deficiencies:   Iron po or infusions as needed  Continue l activity and exercise routine  F/U RD as needed       Time Spent:   1 Hour 15 Minutes

## 2025-06-27 ENCOUNTER — OFFICE VISIT (OUTPATIENT)
Dept: GASTROENTEROLOGY | Facility: CLINIC | Age: 65
End: 2025-06-27
Payer: COMMERCIAL

## 2025-06-27 VITALS
DIASTOLIC BLOOD PRESSURE: 73 MMHG | HEART RATE: 69 BPM | OXYGEN SATURATION: 94 % | TEMPERATURE: 98.5 F | SYSTOLIC BLOOD PRESSURE: 111 MMHG | WEIGHT: 136 LBS | BODY MASS INDEX: 25.68 KG/M2 | HEIGHT: 61 IN

## 2025-06-27 DIAGNOSIS — E61.1 IRON DEFICIENCY: ICD-10-CM

## 2025-06-27 DIAGNOSIS — Q27.30 AVM (ARTERIOVENOUS MALFORMATION): ICD-10-CM

## 2025-06-27 DIAGNOSIS — K21.9 LARYNGOPHARYNGEAL REFLUX (LPR): Primary | ICD-10-CM

## 2025-06-27 DIAGNOSIS — K22.10 EROSIVE ESOPHAGITIS: ICD-10-CM

## 2025-06-27 PROCEDURE — 99214 OFFICE O/P EST MOD 30 MIN: CPT | Performed by: PHYSICIAN ASSISTANT

## 2025-06-27 RX ORDER — OMEPRAZOLE 20 MG/1
20 CAPSULE, DELAYED RELEASE ORAL DAILY
Qty: 90 CAPSULE | Refills: 0 | Status: SHIPPED | OUTPATIENT
Start: 2025-06-27

## 2025-06-27 RX ORDER — CITALOPRAM HYDROBROMIDE 20 MG/1
1 TABLET ORAL DAILY
COMMUNITY
Start: 2025-04-01

## 2025-06-27 NOTE — PROGRESS NOTES
Name: Perri Cortes      : 1960      MRN: [id number]  Encounter Provider: Anahi Aguila PA-C  Encounter Date: 2025   Encounter department: [facility] [place]  :  Assessment & Plan  Laryngopharyngeal reflux (LPR)  Continue omeprazole, lower down to 20 mg.  AVM (arteriovenous malformation)  Iron deficiency  Patient has an upcoming appointment with [facility] hematology.  Patient reports that she will likely have blood work ordered at that appointment.  She is more than welcome to come back to our office if she has worsening iron deficiency as we would perform repeat testing given her history of jejunal AVMs.  Fortunately has done very well after her iron infusions.  Follow-up annually, sooner if patient has evidence of worsening iron deficiency anemia.    History of Present Illness   HPI  Perri Cortes is a 65 y.o. female who presents with history of Flavio-en-Y bypass, anxiety, GERD iron deficiency anemia on iron infusions at [facility].  Patient was last seen by me in December.    Patient had original bypass surgery in [place] in .  In 2025 she underwent diagnostic laparoscopy with lysis of adhesions with Dr. Jemal Curtis.  Patient did have multiple interloop adhesions and omental adhesions to abdominal wall mesh.    Patient reports that from a GI standpoint, the patient is doing well.  Reports that she usually only has a flareup in her acid reflux symptoms in the evening if she has acidic food or eats too late.  She has no other GI complaints at this time. Last CBC was done in 2025 at [facility].  Hemoglobin was 13.7.  Ferritin 130.  Her next appoint with hematology at [facility] is in July.    Last EGD and colonoscopy were performed by Dr. Thakkar. EGD with mild erosion in the GE junction otherwise the remainder exam appears to have been normal. Colonoscopy was normal up to the terminal ileum. Was recommended a 5-year recall.  She then had a capsule endoscopy, but the prep was suboptimal.  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       Review of Systems   Constitutional:  Negative for appetite change, chills, diaphoresis, fatigue and unexpected weight change.   HENT:  Negative for sore throat and trouble swallowing.    Eyes:  Negative for discharge and redness.   Respiratory:  Negative for cough, shortness of breath and wheezing.    Cardiovascular:  Negative for chest pain and palpitations.   Gastrointestinal:  Negative for abdominal pain, anal bleeding, blood in stool, constipation, diarrhea, nausea, rectal pain and vomiting.   Endocrine: Negative for cold intolerance and heat intolerance.   Musculoskeletal:  Negative for joint swelling and myalgias.   Skin:  Negative for pallor and rash.   Neurological:  Negative for dizziness, tremors, weakness, light-headedness, numbness and headaches.   Hematological:  Negative for adenopathy. Does not bruise/bleed easily.   Psychiatric/Behavioral:  Negative for behavioral problems, confusion, dysphoric mood and sleep disturbance. The patient is not nervous/anxious.           Objective   There were no vitals taken for this visit.     Physical Exam  Constitutional:       General: She is not in acute distress.     Appearance: She is well-developed. She is not diaphoretic.   HENT:      Head: Normocephalic and atraumatic.     Eyes:      General: No scleral icterus.     Conjunctiva/sclera: Conjunctivae normal.      Pupils: Pupils are equal, round, and reactive to light.     Neck:      Thyroid: No thyromegaly.      Trachea: No tracheal deviation.     Cardiovascular:      Rate and Rhythm: Normal rate and regular rhythm.   Pulmonary:      Effort: Pulmonary effort is normal.      Breath sounds: Normal breath sounds. No wheezing or rales.   Abdominal:      General: Bowel sounds are normal. There is no distension.      Palpations: Abdomen is soft. Abdomen is not rigid. There  is no mass.      Tenderness: There is no abdominal tenderness. There is no guarding or rebound.     Musculoskeletal:      Cervical back: Neck supple.   Lymphadenopathy:      Cervical: No cervical adenopathy.     Skin:     General: Skin is warm and dry.      Findings: No erythema or rash.     Neurological:      Mental Status: She is alert and oriented to person, place, and time.     Psychiatric:         Behavior: Behavior normal.

## 2025-07-09 ENCOUNTER — TELEPHONE (OUTPATIENT)
Age: 65
End: 2025-07-09

## 2025-07-09 ENCOUNTER — OFFICE VISIT (OUTPATIENT)
Age: 65
End: 2025-07-09
Payer: COMMERCIAL

## 2025-07-09 VITALS
HEIGHT: 61 IN | TEMPERATURE: 96.4 F | DIASTOLIC BLOOD PRESSURE: 60 MMHG | OXYGEN SATURATION: 98 % | SYSTOLIC BLOOD PRESSURE: 100 MMHG | HEART RATE: 90 BPM | BODY MASS INDEX: 26.06 KG/M2 | RESPIRATION RATE: 14 BRPM | WEIGHT: 138 LBS

## 2025-07-09 DIAGNOSIS — Z00.00 ANNUAL PHYSICAL EXAM: ICD-10-CM

## 2025-07-09 DIAGNOSIS — M79.601 RIGHT ARM PAIN: Primary | ICD-10-CM

## 2025-07-09 DIAGNOSIS — Z13.220 ENCOUNTER FOR LIPID SCREENING FOR CARDIOVASCULAR DISEASE: ICD-10-CM

## 2025-07-09 DIAGNOSIS — R73.9 ELEVATED SERUM GLUCOSE: ICD-10-CM

## 2025-07-09 DIAGNOSIS — Z13.6 ENCOUNTER FOR LIPID SCREENING FOR CARDIOVASCULAR DISEASE: ICD-10-CM

## 2025-07-09 DIAGNOSIS — M81.0 OSTEOPOROSIS OF FEMUR WITHOUT PATHOLOGICAL FRACTURE: ICD-10-CM

## 2025-07-09 DIAGNOSIS — E55.9 VITAMIN D DEFICIENCY: ICD-10-CM

## 2025-07-09 DIAGNOSIS — Z23 ENCOUNTER FOR IMMUNIZATION: ICD-10-CM

## 2025-07-09 DIAGNOSIS — E78.5 DYSLIPIDEMIA: ICD-10-CM

## 2025-07-09 DIAGNOSIS — Z98.84 H/O BARIATRIC SURGERY: ICD-10-CM

## 2025-07-09 DIAGNOSIS — F32.2 SEVERE MAJOR DEPRESSIVE DISORDER (HCC): ICD-10-CM

## 2025-07-09 PROBLEM — F32.5 MAJOR DEPRESSIVE DISORDER WITH SINGLE EPISODE, IN FULL REMISSION (HCC): Status: RESOLVED | Noted: 2022-03-07 | Resolved: 2025-07-09

## 2025-07-09 PROCEDURE — 99214 OFFICE O/P EST MOD 30 MIN: CPT | Performed by: FAMILY MEDICINE

## 2025-07-09 PROCEDURE — 99396 PREV VISIT EST AGE 40-64: CPT | Performed by: FAMILY MEDICINE

## 2025-07-09 PROCEDURE — 90677 PCV20 VACCINE IM: CPT | Performed by: FAMILY MEDICINE

## 2025-07-09 PROCEDURE — 90471 IMMUNIZATION ADMIN: CPT | Performed by: FAMILY MEDICINE
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
## 2025-07-09 NOTE — TELEPHONE ENCOUNTER
PROLIA-Schedule appt  Left voice mail asking pt to return call to schedule Prolia administration  Last dose-NEW
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
## 2025-07-09 NOTE — ASSESSMENT & PLAN NOTE
Reviewed DEXA scan from December.  Discussed vitamin D, vitamin K2, calcium.  Discussed bisphosphonates.  Patient in agreement with Bella.  Will have Lima order Bella

## 2025-07-15 ENCOUNTER — NURSE TRIAGE (OUTPATIENT)
Age: 65
End: 2025-07-15

## 2025-07-16 ENCOUNTER — TELEPHONE (OUTPATIENT)
Age: 65
End: 2025-07-16

## 2025-07-16 ENCOUNTER — OFFICE VISIT (OUTPATIENT)
Age: 65
End: 2025-07-16
Payer: COMMERCIAL

## 2025-07-16 VITALS
WEIGHT: 139.4 LBS | BODY MASS INDEX: 26.32 KG/M2 | HEIGHT: 61 IN | TEMPERATURE: 98 F | RESPIRATION RATE: 18 BRPM | HEART RATE: 110 BPM | DIASTOLIC BLOOD PRESSURE: 64 MMHG | OXYGEN SATURATION: 98 % | SYSTOLIC BLOOD PRESSURE: 112 MMHG

## 2025-07-16 DIAGNOSIS — T50.Z95A VACCINE REACTION, INITIAL ENCOUNTER: Primary | ICD-10-CM

## 2025-07-16 PROCEDURE — 99213 OFFICE O/P EST LOW 20 MIN: CPT

## 2025-07-16 NOTE — PROGRESS NOTES
"Name: Perri Cortes      : 1960      MRN: [id number]  Encounter Provider: Jenni Elizabeth PA-C  Encounter Date: 2025   Encounter department: [facility]  :  Assessment & Plan  Vaccine reaction, initial encounter  Pt advised this is a post vaccine inflammatory response.  Reassured against cellulitis as she does not have induration, abscess/drainage, fever, chills, or other systemic symptoms.  Can use Tylenol, antihistamines such as Zyrtec or Allegra over-the-counter (avoid Benadryl as this can be sedating), ice to the area.    Would advise she postpone her Prolia injection for 1 month.  Discussed signs and symptoms of cellulitis or other serious issues from the vaccine and she will follow-up if these occur.              History of Present Illness   HPI    Pt is here for redness and warmth that has occured at the site of her Prevnar 20 vaccination that was given 6 days ago.  She denies fevers, chills, drainage at the site.  Starting to improve - it was up by the shoulder down to the elbow.  Now the redness is limited to the mid upper arm down to the elbow.  She has pain as well, but she also has an issue with her left bicep and had pain prior.  The pain from the injection is resolving.    Review of Systems   Constitutional: Negative.    Respiratory: Negative.     Cardiovascular: Negative.    Musculoskeletal:  Negative for gait problem.   Skin:  Positive for color change and rash.        Left arm     Neurological: Negative.    All other systems reviewed and are negative.      Objective   /64 (BP Location: Left arm, Patient Position: Sitting, Cuff Size: Standard)   Pulse (!) 110   Temp 98 °F (36.7 °C) (Tympanic)   Resp 18   Ht 5' 1\" (1.549 m)   Wt 63.2 kg (139 lb 6.4 oz)   SpO2 98%   BMI 26.34 kg/m²      Physical Exam  Constitutional:       General: She is not in acute distress.     Appearance: Normal appearance.     Cardiovascular:      Rate and Rhythm: Normal " rate and regular rhythm.      Heart sounds: Normal heart sounds.   Pulmonary:      Effort: Pulmonary effort is normal. No respiratory distress.      Breath sounds: Normal breath sounds.   Abdominal:      Palpations: Abdomen is soft.      Tenderness: There is no abdominal tenderness.     Musculoskeletal:         General: Normal range of motion.     Skin:     General: Skin is warm and dry.      Findings: Erythema present.           Comments: Erythema and warmth of L upper arm     Neurological:      Mental Status: She is alert and oriented to person, place, and time.     Psychiatric:         Mood and Affect: Mood normal.

## 2025-07-17 ENCOUNTER — TELEPHONE (OUTPATIENT)
Age: 65
End: 2025-07-17

## 2025-07-17 DIAGNOSIS — M81.0 OSTEOPOROSIS OF FEMUR WITHOUT PATHOLOGICAL FRACTURE: Primary | ICD-10-CM

## 2025-07-17 LAB
25(OH)D3+25(OH)D2 SERPL-MCNC: 52 NG/ML (ref 30–100)
CHOLEST SERPL-MCNC: 176 MG/DL
CHOLEST/HDLC SERPL: 3.1 {RATIO}
EST. AVERAGE GLUCOSE BLD GHB EST-MCNC: 100 MG/DL
HBA1C MFR BLD: 5.1 %
HDLC SERPL-MCNC: 57 MG/DL (ref 23–92)
LDLC SERPL CALC-MCNC: 101 MG/DL
NONHDLC SERPL-MCNC: 119 MG/DL
TRIGL SERPL-MCNC: 92 MG/DL
TSH SERPL-ACNC: 1.99 UIU/ML (ref 0.45–5.33)

## 2025-07-17 NOTE — TELEPHONE ENCOUNTER
[facility] called to please have Prolia script sent to them to fill for patient.  She is requesting script is faxed to [phone].  She will call back to update office once receive script and when ship date will be.

## 2025-08-14 ENCOUNTER — TELEPHONE (OUTPATIENT)
Age: 65
End: 2025-08-14

## 2025-08-18 ENCOUNTER — OFFICE VISIT (OUTPATIENT)
Dept: OBGYN CLINIC | Facility: CLINIC | Age: 65
End: 2025-08-18
Attending: FAMILY MEDICINE
Payer: COMMERCIAL

## 2025-08-18 ENCOUNTER — APPOINTMENT (OUTPATIENT)
Dept: RADIOLOGY | Facility: CLINIC | Age: 65
End: 2025-08-18
Attending: STUDENT IN AN ORGANIZED HEALTH CARE EDUCATION/TRAINING PROGRAM
Payer: COMMERCIAL

## 2025-08-18 VITALS — HEIGHT: 61 IN | BODY MASS INDEX: 26.24 KG/M2 | RESPIRATION RATE: 18 BRPM | WEIGHT: 139 LBS

## 2025-08-18 DIAGNOSIS — M75.82 ROTATOR CUFF TENDINITIS, LEFT: ICD-10-CM

## 2025-08-18 DIAGNOSIS — M79.601 RIGHT ARM PAIN: ICD-10-CM

## 2025-08-18 PROCEDURE — 99203 OFFICE O/P NEW LOW 30 MIN: CPT | Performed by: STUDENT IN AN ORGANIZED HEALTH CARE EDUCATION/TRAINING PROGRAM

## 2025-08-18 PROCEDURE — 73060 X-RAY EXAM OF HUMERUS: CPT

## 2025-08-21 ENCOUNTER — TELEPHONE (OUTPATIENT)
Age: 65
End: 2025-08-21

## (undated) DEVICE — TROCAR: Brand: KII FIOS FIRST ENTRY

## (undated) DEVICE — EXOFIN PRECISION PEN HIGH VISCOSITY TOPICAL SKIN ADHESIVE: Brand: EXOFIN PRECISION PEN, 1G

## (undated) DEVICE — BLUE HEAT SCOPE WARMER

## (undated) DEVICE — SYRINGE 20ML LL

## (undated) DEVICE — TIBURON LAPAROSCOPIC ABDOMINAL DRAPE: Brand: CONVERTORS

## (undated) DEVICE — SURGICAL GOWN, XL SMARTSLEEVE: Brand: CONVERTORS

## (undated) DEVICE — TUBING SMOKE EVAC W/FILTRATION DEVICE PLUMEPORT ACTIV

## (undated) DEVICE — VIOLET BRAIDED (POLYGLACTIN 910), SYNTHETIC ABSORBABLE SUTURE: Brand: COATED VICRYL

## (undated) DEVICE — 4-PORT MANIFOLD: Brand: NEPTUNE 2

## (undated) DEVICE — WEBRIL 6 IN UNSTERILE

## (undated) DEVICE — SPINAL NEEDLE 22 G X 2 1/2

## (undated) DEVICE — INSUFFLATION NEEDLE TO ESTABLISH PNEUMOPERITONEUM.: Brand: INSUFFLATION NEEDLE

## (undated) DEVICE — GLOVE SRG BIOGEL 8

## (undated) DEVICE — CURVED JAW CORDLESS ULTRASONIC DISSECTOR: Brand: SONICISION 7

## (undated) DEVICE — PACK PBDS LAP CHOLE RF

## (undated) DEVICE — SYRINGE 30ML LL

## (undated) DEVICE — GLOVE SRG BIOGEL 7.5

## (undated) DEVICE — DISPOSABLE OR TOWEL: Brand: CARDINAL HEALTH

## (undated) DEVICE — SUT MONOCRYL 4-0 PS-2 27 IN Y426H

## (undated) DEVICE — NEEDLE SPINAL18G X 3.5 IN QUINCKE

## (undated) DEVICE — DECANTER: Brand: UNBRANDED

## (undated) DEVICE — SCD SEQUENTIAL COMPRESSION COMFORT SLEEVE MEDIUM KNEE LENGTH: Brand: KENDALL SCD

## (undated) DEVICE — CHLORAPREP HI-LITE 26ML ORANGE

## (undated) DEVICE — INTENDED FOR TISSUE SEPARATION, AND OTHER PROCEDURES THAT REQUIRE A SHARP SURGICAL BLADE TO PUNCTURE OR CUT.: Brand: BARD-PARKER SAFETY BLADES SIZE 15, STERILE

## (undated) DEVICE — DRAPE EQUIPMENT RF WAND

## (undated) DEVICE — REM POLYHESIVE ADULT PATIENT RETURN ELECTRODE: Brand: VALLEYLAB

## (undated) DEVICE — TROCAR: Brand: KII® SLEEVE

## (undated) DEVICE — [HIGH FLOW INSUFFLATOR,  DO NOT USE IF PACKAGE IS DAMAGED,  KEEP DRY,  KEEP AWAY FROM SUNLIGHT,  PROTECT FROM HEAT AND RADIOACTIVE SOURCES.]: Brand: PNEUMOSURE

## (undated) DEVICE — PLUMEPEN PRO 10FT